# Patient Record
Sex: MALE | Race: WHITE | URBAN - METROPOLITAN AREA
[De-identification: names, ages, dates, MRNs, and addresses within clinical notes are randomized per-mention and may not be internally consistent; named-entity substitution may affect disease eponyms.]

---

## 2017-10-02 ENCOUNTER — OFFICE VISIT (OUTPATIENT)
Dept: INTERNAL MEDICINE CLINIC | Age: 62
End: 2017-10-02

## 2017-10-02 VITALS
HEART RATE: 61 BPM | SYSTOLIC BLOOD PRESSURE: 129 MMHG | BODY MASS INDEX: 24.52 KG/M2 | RESPIRATION RATE: 18 BRPM | HEIGHT: 72 IN | WEIGHT: 181 LBS | TEMPERATURE: 97.5 F | OXYGEN SATURATION: 95 % | DIASTOLIC BLOOD PRESSURE: 72 MMHG

## 2017-10-02 DIAGNOSIS — Z00.00 ROUTINE GENERAL MEDICAL EXAMINATION AT A HEALTH CARE FACILITY: Primary | ICD-10-CM

## 2017-10-02 DIAGNOSIS — E78.00 HYPERCHOLESTEREMIA: ICD-10-CM

## 2017-10-02 RX ORDER — CHOLECALCIFEROL TAB 125 MCG (5000 UNIT) 125 MCG
TAB ORAL DAILY
COMMUNITY

## 2017-10-02 RX ORDER — PRAVASTATIN SODIUM 40 MG/1
40 TABLET ORAL
Qty: 30 TAB | Refills: 2 | Status: SHIPPED | OUTPATIENT
Start: 2017-10-02 | End: 2017-12-21 | Stop reason: SDUPTHER

## 2017-10-02 RX ORDER — PRAVASTATIN SODIUM 40 MG/1
40 TABLET ORAL
COMMUNITY
End: 2018-01-09 | Stop reason: SDUPTHER

## 2017-10-02 RX ORDER — PRAVASTATIN SODIUM 10 MG/1
40 TABLET ORAL
COMMUNITY
End: 2017-10-02

## 2017-10-02 RX ORDER — PETROLATUM,WHITE/LANOLIN
OINTMENT (GRAM) TOPICAL 3 TIMES DAILY
COMMUNITY
End: 2018-10-18

## 2017-10-02 NOTE — MR AVS SNAPSHOT
Visit Information Date & Time Provider Department Dept. Phone Encounter #  
 10/2/2017  2:30 PM Yesenia RuedaJUAN [email or web address] [phone] [id number] Follow-up Instructions Return in about 4 days (around 10/6/2017), or if symptoms worsen or fail to improve. Upcoming Health Maintenance Date Due DTaP/Tdap/Td series (1 - Tdap) 3/30/1975 FOBT Q 1 YEAR AGE 50-75 3/30/2004 ZOSTER VACCINE AGE 60> 1/30/2014 INFLUENZA AGE 9 TO ADULT 8/1/2017 Allergies as of 10/2/2017  Never Reviewed Severity Noted Reaction Type Reactions Hayfebrol [Chlorpheniramine-pseudoephed]  10/02/2017    Runny Nose Current Immunizations  Never Reviewed No immunizations on file. Not reviewed this visit You Were Diagnosed With   
  
 Codes Comments Routine general medical examination at a health care facility    -  Primary ICD-10-CM: Z00.00 ICD-9-CM: V70.0 Hypercholesteremia     ICD-10-CM: E78.00 ICD-9-CM: 272.0 Vitals BP Pulse Temp Resp Height(growth percentile) Weight(growth percentile) 129/72 (BP 1 Location: Right arm, BP Patient Position: Sitting) 61 97.5 °F (36.4 °C) (Oral) 18 6' (1.829 m) 181 lb (82.1 kg) SpO2 BMI Smoking Status 95% 24.55 kg/m2 Never Smoker Vitals History BMI and BSA Data Body Mass Index Body Surface Area 24.55 kg/m 2 2.04 m 2 Preferred Pharmacy Pharmacy Name Phone [facility], [address] [place] [address] [phone] Your Updated Medication List  
  
   
This list is accurate as of: 10/2/17  3:02 PM.  Always use your most recent med list.  
  
  
  
  
 cholecalciferol (VITAMIN D3) 5,000 unit Tab tablet Commonly known as:  VITAMIN D3 Take  by mouth daily. glucosamine sulfate 500 mg capsule Take  by mouth three (3) times daily. * pravastatin 40 mg tablet Commonly known as:  PRAVACHOL  
 Take 40 mg by mouth nightly. * pravastatin 40 mg tablet Commonly known as:  PRAVACHOL Take 1 Tab by mouth nightly. * Notice: This list has 2 medication(s) that are the same as other medications prescribed for you. Read the directions carefully, and ask your doctor or other care provider to review them with you. Prescriptions Sent to Pharmacy Refills  
 pravastatin (PRAVACHOL) 40 mg tablet 2 Sig: Take 1 Tab by mouth nightly. Class: Normal  
 Pharmacy: [facility] [address] [place] [address], [address] [place] [address] #: [phone] Route: Oral  
  
Follow-up Instructions Return in about 4 days (around 10/6/2017), or if symptoms worsen or fail to improve. To-Do List   
 10/02/2017 Lab:  URINALYSIS W/MICROSCOPIC   
  
 10/03/2017 Lab:  CBC WITH AUTOMATED DIFF   
  
 10/03/2017 Lab:  LIPID PANEL   
  
 10/03/2017 Lab:  METABOLIC PANEL, COMPREHENSIVE Patient Instructions High Cholesterol: Care Instructions Your Care Instructions Cholesterol is a type of fat in your blood. It is needed for many body functions, such as making new cells. Cholesterol is made by your body. It also comes from food you eat. High cholesterol means that you have too much of the fat in your blood. This raises your risk of a heart attack and stroke. LDL and HDL are part of your total cholesterol. LDL is the \"bad\" cholesterol. High LDL can raise your risk for heart disease, heart attack, and stroke. HDL is the \"good\" cholesterol. It helps clear bad cholesterol from the body. High HDL is linked with a lower risk of heart disease, heart attack, and stroke. Your cholesterol levels help your doctor find out your risk for having a heart attack or stroke. You and your doctor can talk about whether you need to lower your risk and what treatment is best for you.  
A heart-healthy lifestyle along with medicines can help lower your cholesterol and your risk. The way you choose to lower your risk will depend on how high your risk is for heart attack and stroke. It will also depend on how you feel about taking medicines. Follow-up care is a key part of your treatment and safety. Be sure to make and go to all appointments, and call your doctor if you are having problems. It's also a good idea to know your test results and keep a list of the medicines you take. How can you care for yourself at home? · Eat a variety of foods every day. Good choices include fruits, vegetables, whole grains (like oatmeal), dried beans and peas, nuts and seeds, soy products (like tofu), and fat-free or low-fat dairy products. · Replace butter, margarine, and hydrogenated or partially hydrogenated oils with olive and canola oils. (Canola oil margarine without trans fat is fine.) · Replace red meat with fish, poultry, and soy protein (like tofu). · Limit processed and packaged foods like chips, crackers, and cookies. · Bake, broil, or steam foods. Don't dotson them. · Be physically active. Get at least 30 minutes of exercise on most days of the week. Walking is a good choice. You also may want to do other activities, such as running, swimming, cycling, or playing tennis or team sports. · Stay at a healthy weight or lose weight by making the changes in eating and physical activity listed above. Losing just a small amount of weight, even 5 to 10 pounds, can reduce your risk for having a heart attack or stroke. · Do not smoke. When should you call for help? Watch closely for changes in your health, and be sure to contact your doctor if: 
· You need help making lifestyle changes. · You have questions about your medicine. Where can you learn more? Go to http://tatiana-mason.info/. Enter D876 in the search box to learn more about \"High Cholesterol: Care Instructions. \" Current as of: April 3, 2017 Content Version: 11.3 © 8911-7554 Healthwise, [employer]. Care instructions adapted under license by [employer] (which disclaims liability or warranty for this information). If you have questions about a medical condition or this instruction, always ask your healthcare professional. [address] any warranty or liability for your use of this information. Well Visit, Men 48 to 72: Care Instructions Your Care Instructions Physical exams can help you stay healthy. Your doctor has checked your overall health and may have suggested ways to take good care of yourself. He or she also may have recommended tests. At home, you can help prevent illness with healthy eating, regular exercise, and other steps. Follow-up care is a key part of your treatment and safety. Be sure to make and go to all appointments, and call your doctor if you are having problems. It's also a good idea to know your test results and keep a list of the medicines you take. How can you care for yourself at home? · Reach and stay at a healthy weight. This will lower your risk for many problems, such as obesity, diabetes, heart disease, and high blood pressure. · Get at least 30 minutes of exercise on most days of the week. Walking is a good choice. You also may want to do other activities, such as running, swimming, cycling, or playing tennis or team sports. · Do not smoke. Smoking can make health problems worse. If you need help quitting, talk to your doctor about stop-smoking programs and medicines. These can increase your chances of quitting for good. · Protect your skin from too much sun. When you're outdoors from 10 a.m. to 4 p.m., stay in the shade or cover up with clothing and a hat with a wide brim. Wear sunglasses that block UV rays. Even when it's cloudy, put broad-spectrum sunscreen (SPF 30 or higher) on any exposed skin. · See a dentist one or two times a year for checkups and to have your teeth cleaned. · Wear a seat belt in the car. · Limit alcohol to 2 drinks a day. Too much alcohol can cause health problems. Follow your doctor's advice about when to have certain tests. These tests can spot problems early. · Cholesterol. Your doctor will tell you how often to have this done based on your overall health and other things that can increase your risk for heart attack and stroke. · Blood pressure. Have your blood pressure checked during a routine doctor visit. Your doctor will tell you how often to check your blood pressure based on your age, your blood pressure results, and other factors. · Prostate exam. Talk to your doctor about whether you should have a blood test (called a PSA test) for prostate cancer. Experts disagree on whether men should have this test. Some experts recommend that you discuss the benefits and risks of the test with your doctor. · Diabetes. Ask your doctor whether you should have tests for diabetes. · Vision. Some experts recommend that you have yearly exams for glaucoma and other age-related eye problems starting at age 48. · Hearing. Tell your doctor if you notice any change in your hearing. You can have tests to find out how well you hear. · Colon cancer. You should begin tests for colon cancer at age 48. You may have one of several tests. Your doctor will tell you how often to have tests based on your age and risk. Risks include whether you already had a precancerous polyp removed from your colon or whether your parent, brother, sister, or child has had colon cancer. · Heart attack and stroke risk. At least every 4 to 6 years, you should have your risk for heart attack and stroke assessed. Your doctor uses factors such as your age, blood pressure, cholesterol, and whether you smoke or have diabetes to show what your risk for a heart attack or stroke is over the next 10 years. · Abdominal aortic aneurysm.  Ask your doctor whether you should have a test to check for an aneurysm. You may need a test if you ever smoked or if your parent, brother, sister, or child has had an aneurysm. When should you call for help? Watch closely for changes in your health, and be sure to contact your doctor if you have any problems or symptoms that concern you. Where can you learn more? Go to http://tatiana-mason.info/. Enter Z825 in the search box to learn more about \"Well Visit, Men 48 to 72: Care Instructions. \" Current as of: July 19, 2016 Content Version: 11.3 © 3146-6165 [employer]. Care instructions adapted under license by [employer] (which disclaims liability or warranty for this information). If you have questions about a medical condition or this instruction, always ask your healthcare professional. [address] any warranty or liability for your use of this information. Introducing [facility]! Elizabeth Guerra introduces [employer] patient portal. Now you can access parts of your medical record, email your doctor's office, and request medication refills online. 1. In your internet browser, go to [email or web address] 2. Click on the First Time User? Click Here link in the Sign In box. You will see the New Member Sign Up page. 3. Enter your [employer] Access Code exactly as it appears below. You will not need to use this code after youve completed the sign-up process. If you do not sign up before the expiration date, you must request a new code. · [employer] Access Code: [email or web address] Expires: 12/31/2017  3:01 PM 
 
4. Enter the last four digits of your Social Security Number (xxxx) and Date of Birth (mm/dd/yyyy) as indicated and click Submit. You will be taken to the next sign-up page. 5. Create a [employer] ID. This will be your [employer] login ID and cannot be changed, so think of one that is secure and easy to remember. 6. Create a [employer] password. You can change your password at any time. 7. Enter your Password Reset Question and Answer. This can be used at a later time if you forget your password. 8. Enter your e-mail address. You will receive e-mail notification when new information is available in [address] 9. Click Sign Up. You can now view and download portions of your medical record. 10. Click the Download Summary menu link to download a portable copy of your medical information. If you have questions, please visit the Frequently Asked Questions section of the [employer] website. Remember, [employer] is NOT to be used for urgent needs. For medical emergencies, dial 911. Now available from your iPhone and Android! Please provide this summary of care documentation to your next provider. If you have any questions after today's visit, please call [phone].

## 2017-10-02 NOTE — PROGRESS NOTES
HISTORY OF PRESENT ILLNESS  Rona Benavides is a 60 y.o. male. Patient with a hx of hypercholesterolemia presents to establish care. Patient relocating from [place],\[facility] [place] [facility]\"" his wife is also seeing a doctor in the practice. Patient takes Pravastatin but is almost running out and would like to get a refill because he is about to travel. Patient will return in the AM for fasting labs. Patient denies any HA,blurry vision, unilateral weakness, slurred speech,facial drooling,drooping, NV,numbness,tingling,dizziness,palpitations, malaise,faigue,confusion,SOB,CP. Medication Refill   The history is provided by the patient. Review of Systems   Constitutional: Negative. HENT: Negative. Eyes: Negative. Respiratory: Negative. Cardiovascular: Negative. Gastrointestinal: Negative. Genitourinary: Negative. Musculoskeletal: Negative. Skin: Negative. Neurological: Negative. Psychiatric/Behavioral: Negative. Physical Exam   Constitutional: He is oriented to person, place, and time. He appears well-developed and well-nourished. /72 (BP 1 Location: Right arm, BP Patient Position: Sitting)  Pulse 61  Temp 97.5 °F (36.4 °C) (Oral)   Resp 18  Ht 6' (1.829 m)  Wt 181 lb (82.1 kg)  SpO2 95%  BMI 24.55 kg/m2     HENT:   Head: Normocephalic and atraumatic. Eyes: Conjunctivae and EOM are normal. Pupils are equal, round, and reactive to light. Neck: Normal range of motion. Cardiovascular: Normal rate. Pulmonary/Chest: Effort normal and breath sounds normal.   Abdominal: Soft. Bowel sounds are normal.   Musculoskeletal: Normal range of motion. Neurological: He is alert and oriented to person, place, and time. GCS eye subscore is 4. GCS verbal subscore is 5. GCS motor subscore is 6. Skin: Skin is warm and dry. Psychiatric: He has a normal mood and affect.  His speech is normal and behavior is normal. Judgment and thought content normal. Cognition and memory are normal.   Vitals reviewed. ASSESSMENT and PLAN    ICD-10-CM ICD-9-CM    1. Routine general medical examination at a health care facility F44.14 M43.9 METABOLIC PANEL, COMPREHENSIVE      CBC WITH AUTOMATED DIFF      URINALYSIS W/MICROSCOPIC   2. Hypercholesteremia E78.00 272.0 LIPID PANEL      pravastatin (PRAVACHOL) 40 mg tablet     Encounter Diagnoses   Name Primary?  Routine general medical examination at a health care facility Yes    Hypercholesteremia      Orders Placed This Encounter    METABOLIC PANEL, COMPREHENSIVE    CBC WITH AUTOMATED DIFF    LIPID PANEL    URINALYSIS W/MICROSCOPIC    DISCONTD: pravastatin (PRAVACHOL) 10 mg tablet    cholecalciferol, VITAMIN D3, (VITAMIN D3) 5,000 unit tab tablet    glucosamine sulfate 500 mg capsule    pravastatin (PRAVACHOL) 40 mg tablet    pravastatin (PRAVACHOL) 40 mg tablet     Orders Placed This Encounter    METABOLIC PANEL, COMPREHENSIVE     Standing Status:   Future     Standing Expiration Date:   10/3/2018    CBC WITH AUTOMATED DIFF     Standing Status:   Future     Standing Expiration Date:   10/3/2018    LIPID PANEL     Standing Status:   Future     Standing Expiration Date:   10/3/2018    URINALYSIS W/MICROSCOPIC     Standing Status:   Future     Standing Expiration Date:   10/3/2018    pravastatin (PRAVACHOL) 40 mg tablet     Sig: Take 1 Tab by mouth nightly. Dispense:  30 Tab     Refill:  2     Orders Placed This Encounter    METABOLIC PANEL, COMPREHENSIVE    CBC WITH AUTOMATED DIFF    LIPID PANEL    URINALYSIS W/MICROSCOPIC    pravastatin (PRAVACHOL) 40 mg tablet     Diagnoses and all orders for this visit:    1. Routine general medical examination at a health care facility  -     METABOLIC PANEL, COMPREHENSIVE; Future  -     CBC WITH AUTOMATED DIFF; Future  -     URINALYSIS W/MICROSCOPIC; Future    2. Hypercholesteremia  -     LIPID PANEL; Future  -     pravastatin (PRAVACHOL) 40 mg tablet; Take 1 Tab by mouth nightly.       Follow-up Disposition:  Return in about 4 days (around 10/6/2017), or if symptoms worsen or fail to improve. current treatment plan is effective, no change in therapy  the following changes in treatment are made: f/u x 4 months.

## 2017-10-02 NOTE — PROGRESS NOTES
ROOM # 9    Minh Barnett presents today for   Chief Complaint   Patient presents with    Medication Refill     pt states he is going out of town and need a refill on his cholesterol medication (pravastatin 40 mg)       Minh Barnett preferred language for health care discussion is english/other. Is someone accompanying this pt? no    Is the patient using any DME equipment during OV? no    Depression Screening:  PHQ over the last two weeks 10/2/2017   Little interest or pleasure in doing things Not at all   Feeling down, depressed or hopeless Not at all   Total Score PHQ 2 0       Learning Assessment:  Learning Assessment 10/2/2017   PRIMARY LEARNER Patient   HIGHEST LEVEL OF EDUCATION - PRIMARY LEARNER  SOME COLLEGE   PRIMARY LANGUAGE ENGLISH   LEARNER PREFERENCE PRIMARY DEMONSTRATION   ANSWERED BY patient   RELATIONSHIP SELF       Abuse Screening:  No flowsheet data found. Fall Risk  No flowsheet data found. Health Maintenance reviewed and discussed per provider. Yes    Minh Barnett is due for pt will discuss with PCP. Please order/place referral if appropriate. Advance Directive:  1. Do you have an advance directive in place? Patient Reply: no    2. If not, would you like material regarding how to put one in place? Patient Reply: no    Coordination of Care:  1. Have you been to the ER, urgent care clinic since your last visit? Hospitalized since your last visit? no    2. Have you seen or consulted any other health care providers outside of the [address] [place] [address] since your last visit? Include any pap smears or colon screening.  no

## 2017-10-02 NOTE — PATIENT INSTRUCTIONS
High Cholesterol: Care Instructions  Your Care Instructions  Cholesterol is a type of fat in your blood. It is needed for many body functions, such as making new cells. Cholesterol is made by your body. It also comes from food you eat. High cholesterol means that you have too much of the fat in your blood. This raises your risk of a heart attack and stroke. LDL and HDL are part of your total cholesterol. LDL is the \"bad\" cholesterol. High LDL can raise your risk for heart disease, heart attack, and stroke. HDL is the \"good\" cholesterol. It helps clear bad cholesterol from the body. High HDL is linked with a lower risk of heart disease, heart attack, and stroke. Your cholesterol levels help your doctor find out your risk for having a heart attack or stroke. You and your doctor can talk about whether you need to lower your risk and what treatment is best for you. A heart-healthy lifestyle along with medicines can help lower your cholesterol and your risk. The way you choose to lower your risk will depend on how high your risk is for heart attack and stroke. It will also depend on how you feel about taking medicines. Follow-up care is a key part of your treatment and safety. Be sure to make and go to all appointments, and call your doctor if you are having problems. It's also a good idea to know your test results and keep a list of the medicines you take. How can you care for yourself at home? · Eat a variety of foods every day. Good choices include fruits, vegetables, whole grains (like oatmeal), dried beans and peas, nuts and seeds, soy products (like tofu), and fat-free or low-fat dairy products. · Replace butter, margarine, and hydrogenated or partially hydrogenated oils with olive and canola oils. (Canola oil margarine without trans fat is fine.)  · Replace red meat with fish, poultry, and soy protein (like tofu). · Limit processed and packaged foods like chips, crackers, and cookies.   · Bake, broil, or steam foods. Don't dotson them. · Be physically active. Get at least 30 minutes of exercise on most days of the week. Walking is a good choice. You also may want to do other activities, such as running, swimming, cycling, or playing tennis or team sports. · Stay at a healthy weight or lose weight by making the changes in eating and physical activity listed above. Losing just a small amount of weight, even 5 to 10 pounds, can reduce your risk for having a heart attack or stroke. · Do not smoke. When should you call for help? Watch closely for changes in your health, and be sure to contact your doctor if:  · You need help making lifestyle changes. · You have questions about your medicine. Where can you learn more? Go to http://tatiana-mason.info/. Enter O829 in the search box to learn more about \"High Cholesterol: Care Instructions. \"  Current as of: April 3, 2017  Content Version: 11.3  © 0419-8949 [employer]. Care instructions adapted under license by [employer] (which disclaims liability or warranty for this information). If you have questions about a medical condition or this instruction, always ask your healthcare professional. [place] [address] any warranty or liability for your use of this information. Well Visit, Men 48 to 72: Care Instructions  Your Care Instructions  Physical exams can help you stay healthy. Your doctor has checked your overall health and may have suggested ways to take good care of yourself. He or she also may have recommended tests. At home, you can help prevent illness with healthy eating, regular exercise, and other steps. Follow-up care is a key part of your treatment and safety. Be sure to make and go to all appointments, and call your doctor if you are having problems. It's also a good idea to know your test results and keep a list of the medicines you take. How can you care for yourself at home?   · Reach and stay at a healthy weight. This will lower your risk for many problems, such as obesity, diabetes, heart disease, and high blood pressure. · Get at least 30 minutes of exercise on most days of the week. Walking is a good choice. You also may want to do other activities, such as running, swimming, cycling, or playing tennis or team sports. · Do not smoke. Smoking can make health problems worse. If you need help quitting, talk to your doctor about stop-smoking programs and medicines. These can increase your chances of quitting for good. · Protect your skin from too much sun. When you're outdoors from 10 a.m. to 4 p.m., stay in the shade or cover up with clothing and a hat with a wide brim. Wear sunglasses that block UV rays. Even when it's cloudy, put broad-spectrum sunscreen (SPF 30 or higher) on any exposed skin. · See a dentist one or two times a year for checkups and to have your teeth cleaned. · Wear a seat belt in the car. · Limit alcohol to 2 drinks a day. Too much alcohol can cause health problems. Follow your doctor's advice about when to have certain tests. These tests can spot problems early. · Cholesterol. Your doctor will tell you how often to have this done based on your overall health and other things that can increase your risk for heart attack and stroke. · Blood pressure. Have your blood pressure checked during a routine doctor visit. Your doctor will tell you how often to check your blood pressure based on your age, your blood pressure results, and other factors. · Prostate exam. Talk to your doctor about whether you should have a blood test (called a PSA test) for prostate cancer. Experts disagree on whether men should have this test. Some experts recommend that you discuss the benefits and risks of the test with your doctor. · Diabetes. Ask your doctor whether you should have tests for diabetes. · Vision.  Some experts recommend that you have yearly exams for glaucoma and other age-related eye problems starting at age 48. · Hearing. Tell your doctor if you notice any change in your hearing. You can have tests to find out how well you hear. · Colon cancer. You should begin tests for colon cancer at age 48. You may have one of several tests. Your doctor will tell you how often to have tests based on your age and risk. Risks include whether you already had a precancerous polyp removed from your colon or whether your parent, brother, sister, or child has had colon cancer. · Heart attack and stroke risk. At least every 4 to 6 years, you should have your risk for heart attack and stroke assessed. Your doctor uses factors such as your age, blood pressure, cholesterol, and whether you smoke or have diabetes to show what your risk for a heart attack or stroke is over the next 10 years. · Abdominal aortic aneurysm. Ask your doctor whether you should have a test to check for an aneurysm. You may need a test if you ever smoked or if your parent, brother, sister, or child has had an aneurysm. When should you call for help? Watch closely for changes in your health, and be sure to contact your doctor if you have any problems or symptoms that concern you. Where can you learn more? Go to http://tatiana-mason.info/. Enter O789 in the search box to learn more about \"Well Visit, Men 48 to 72: Care Instructions. \"  Current as of: July 19, 2016  Content Version: 11.3  © 9465-6461 [employer]. Care instructions adapted under license by [employer] (which disclaims liability or warranty for this information). If you have questions about a medical condition or this instruction, always ask your healthcare professional. [place] [address] any warranty or liability for your use of this information.

## 2017-10-02 NOTE — PROGRESS NOTES
Patient with a hx of hypercholesterolemia presents to establish care. Patient relocating from [place],[facility] his wife is also seeing a doctor in the practice. Patient takes Pravastatin but is almost running out and would like to get a refill because he is about to travel. Patient will return in the AM for fasting labs. Patient denies any HA,blurry vision, unilateral weakness, slurred speech,facial drooling,drooping, NV,numbness,tingling,dizziness,palpitations, malaise,faigue,confusion,SOB,CP.

## 2017-10-03 ENCOUNTER — HOSPITAL ENCOUNTER (OUTPATIENT)
Dept: LAB | Age: 62
Discharge: HOME | End: 2017-10-03
Payer: COMMERCIAL

## 2017-10-03 DIAGNOSIS — Z00.00 ROUTINE GENERAL MEDICAL EXAMINATION AT A HEALTH CARE FACILITY: ICD-10-CM

## 2017-10-03 DIAGNOSIS — E78.00 HYPERCHOLESTEREMIA: ICD-10-CM

## 2017-10-03 LAB
ALBUMIN SERPL-MCNC: 4.4 G/DL (ref 3.4–5)
ALBUMIN/GLOB SERPL: 1.4 {RATIO} (ref 0.8–1.7)
ALP SERPL-CCNC: 72 U/L (ref 45–117)
ALT SERPL-CCNC: 34 U/L (ref 16–61)
ANION GAP SERPL CALC-SCNC: 2 MMOL/L (ref 3–18)
APPEARANCE UR: CLEAR
AST SERPL-CCNC: 21 U/L (ref 15–37)
BACTERIA URNS QL MICRO: NEGATIVE /HPF
BASOPHILS # BLD: 0.1 K/UL (ref 0–0.06)
BASOPHILS NFR BLD: 1 % (ref 0–2)
BILIRUB SERPL-MCNC: 0.5 MG/DL (ref 0.2–1)
BILIRUB UR QL: NEGATIVE
BUN SERPL-MCNC: 19 MG/DL (ref 7–18)
BUN/CREAT SERPL: 14 (ref 12–20)
CALCIUM SERPL-MCNC: 9.3 MG/DL (ref 8.5–10.1)
CHLORIDE SERPL-SCNC: 105 MMOL/L (ref 100–108)
CHOLEST SERPL-MCNC: 217 MG/DL
CO2 SERPL-SCNC: 29 MMOL/L (ref 21–32)
COLOR UR: YELLOW
CREAT SERPL-MCNC: 1.32 MG/DL (ref 0.6–1.3)
DIFFERENTIAL METHOD BLD: ABNORMAL
EOSINOPHIL # BLD: 0.2 K/UL (ref 0–0.4)
EOSINOPHIL NFR BLD: 3 % (ref 0–5)
EPITH CASTS URNS QL MICRO: NORMAL /LPF (ref 0–5)
ERYTHROCYTE [DISTWIDTH] IN BLOOD BY AUTOMATED COUNT: 14.6 % (ref 11.6–14.5)
GLOBULIN SER CALC-MCNC: 3.2 G/DL (ref 2–4)
GLUCOSE SERPL-MCNC: 83 MG/DL (ref 74–99)
GLUCOSE UR STRIP.AUTO-MCNC: NEGATIVE MG/DL
HCT VFR BLD AUTO: 46.4 % (ref 36–48)
HDLC SERPL-MCNC: 83 MG/DL (ref 40–60)
HDLC SERPL: 2.6 {RATIO} (ref 0–5)
HGB BLD-MCNC: 15.6 G/DL (ref 13–16)
HGB UR QL STRIP: NEGATIVE
KETONES UR QL STRIP.AUTO: NEGATIVE MG/DL
LDLC SERPL CALC-MCNC: 113.2 MG/DL (ref 0–100)
LEUKOCYTE ESTERASE UR QL STRIP.AUTO: NEGATIVE
LIPID PROFILE,FLP: ABNORMAL
LYMPHOCYTES # BLD: 1.4 K/UL (ref 0.9–3.6)
LYMPHOCYTES NFR BLD: 22 % (ref 21–52)
MCH RBC QN AUTO: 31.6 PG (ref 24–34)
MCHC RBC AUTO-ENTMCNC: 33.6 G/DL (ref 31–37)
MCV RBC AUTO: 93.9 FL (ref 74–97)
MONOCYTES # BLD: 0.5 K/UL (ref 0.05–1.2)
MONOCYTES NFR BLD: 8 % (ref 3–10)
NEUTS SEG # BLD: 4.3 K/UL (ref 1.8–8)
NEUTS SEG NFR BLD: 66 % (ref 40–73)
NITRITE UR QL STRIP.AUTO: NEGATIVE
PH UR STRIP: 5.5 [PH] (ref 5–8)
PLATELET # BLD AUTO: 218 K/UL (ref 135–420)
PMV BLD AUTO: 10.8 FL (ref 9.2–11.8)
POTASSIUM SERPL-SCNC: 4.1 MMOL/L (ref 3.5–5.5)
PROT SERPL-MCNC: 7.6 G/DL (ref 6.4–8.2)
PROT UR STRIP-MCNC: NEGATIVE MG/DL
RBC # BLD AUTO: 4.94 M/UL (ref 4.7–5.5)
RBC #/AREA URNS HPF: 0 /HPF (ref 0–5)
SODIUM SERPL-SCNC: 136 MMOL/L (ref 136–145)
SP GR UR REFRACTOMETRY: 1.01 (ref 1–1.03)
TRIGL SERPL-MCNC: 104 MG/DL (ref ?–150)
UROBILINOGEN UR QL STRIP.AUTO: 0.2 EU/DL (ref 0.2–1)
VLDLC SERPL CALC-MCNC: 20.8 MG/DL
WBC # BLD AUTO: 6.5 K/UL (ref 4.6–13.2)
WBC URNS QL MICRO: NORMAL /HPF (ref 0–4)

## 2017-10-03 PROCEDURE — 36415 COLL VENOUS BLD VENIPUNCTURE: CPT | Performed by: NURSE PRACTITIONER

## 2017-10-03 PROCEDURE — 81001 URINALYSIS AUTO W/SCOPE: CPT | Performed by: NURSE PRACTITIONER

## 2017-10-03 PROCEDURE — 85025 COMPLETE CBC W/AUTO DIFF WBC: CPT | Performed by: NURSE PRACTITIONER

## 2017-10-03 PROCEDURE — 80053 COMPREHEN METABOLIC PANEL: CPT | Performed by: NURSE PRACTITIONER

## 2017-10-03 PROCEDURE — 80061 LIPID PANEL: CPT | Performed by: NURSE PRACTITIONER

## 2017-10-04 ENCOUNTER — CLINICAL SUPPORT (OUTPATIENT)
Dept: INTERNAL MEDICINE CLINIC | Age: 62
End: 2017-10-04

## 2017-10-04 DIAGNOSIS — Z23 ENCOUNTER FOR IMMUNIZATION: Primary | ICD-10-CM

## 2017-10-04 NOTE — PROGRESS NOTES
Conrado Chacon is a 60 y.o. male who presents for routine immunizations. He denies any symptoms , reactions or allergies that would exclude them from being immunized today. Risks and adverse reactions were discussed and the VIS was given to them. All questions were addressed. He was observed for 10 min post injection. There were no reactions observed.     Wilfredo Lilly LPN

## 2017-12-14 ENCOUNTER — OFFICE VISIT (OUTPATIENT)
Dept: INTERNAL MEDICINE CLINIC | Age: 62
End: 2017-12-14

## 2017-12-14 VITALS
HEART RATE: 68 BPM | OXYGEN SATURATION: 95 % | SYSTOLIC BLOOD PRESSURE: 137 MMHG | WEIGHT: 184 LBS | BODY MASS INDEX: 24.92 KG/M2 | TEMPERATURE: 97.6 F | HEIGHT: 72 IN | RESPIRATION RATE: 17 BRPM | DIASTOLIC BLOOD PRESSURE: 88 MMHG

## 2017-12-14 DIAGNOSIS — J01.81 OTHER ACUTE RECURRENT SINUSITIS: Primary | ICD-10-CM

## 2017-12-14 DIAGNOSIS — J34.89 SINUS PRESSURE: ICD-10-CM

## 2017-12-14 RX ORDER — AZITHROMYCIN 500 MG/1
500 TABLET, FILM COATED ORAL DAILY
Qty: 5 TAB | Refills: 0 | Status: SHIPPED | OUTPATIENT
Start: 2017-12-14 | End: 2018-04-24

## 2017-12-14 RX ORDER — FLUTICASONE PROPIONATE 50 MCG
2 SPRAY, SUSPENSION (ML) NASAL DAILY
COMMUNITY
End: 2019-02-18 | Stop reason: SDUPTHER

## 2017-12-14 RX ORDER — FLUTICASONE PROPIONATE 50 MCG
2 SPRAY, SUSPENSION (ML) NASAL DAILY
Qty: 1 BOTTLE | Refills: 0 | Status: SHIPPED | OUTPATIENT
Start: 2017-12-14

## 2017-12-14 NOTE — MR AVS SNAPSHOT
Visit Information Date & Time Provider Department Dept. Phone Encounter #  
 12/14/2017  1:30 PM Valerie Shepherd NP [facility] [phone] [id number] Follow-up Instructions Return if symptoms worsen or fail to improve. Upcoming Health Maintenance Date Due Hepatitis C Screening 1955 DTaP/Tdap/Td series (1 - Tdap) 3/31/1975 FOBT Q 1 YEAR AGE 50-75 3/31/2004 ZOSTER VACCINE AGE 60> 1/31/2014 Allergies as of 12/14/2017  Review Complete On: 12/14/2017 By: Nalini Vigil Severity Noted Reaction Type Reactions Hayfebrol [Chlorpheniramine-pseudoephed]  10/02/2017    Runny Nose Current Immunizations  Never Reviewed Name Date Influenza Vaccine (Quad) PF 10/4/2017 Not reviewed this visit You Were Diagnosed With   
  
 Codes Comments Other acute recurrent sinusitis    -  Primary ICD-10-CM: J01.81 
ICD-9-CM: 461.9 Sinus pressure     ICD-10-CM: J34.89 ICD-9-CM: 478.19 Vitals BP Pulse Temp Resp Height(growth percentile) Weight(growth percentile) 137/88 (BP 1 Location: Right arm, BP Patient Position: Sitting) 68 97.6 °F (36.4 °C) (Oral) 17 6' (1.829 m) 184 lb (83.5 kg) SpO2 BMI Smoking Status 95% 24.95 kg/m2 Never Smoker Vitals History BMI and BSA Data Body Mass Index Body Surface Area 24.95 kg/m 2 2.06 m 2 Preferred Pharmacy Pharmacy Name Phone [facility] [address] [place] [address] [phone] Your Updated Medication List  
  
   
This list is accurate as of: 12/14/17  2:14 PM.  Always use your most recent med list.  
  
  
  
  
 azithromycin 500 mg Tab Commonly known as:  Sherry Collar Take 1 Tab by mouth daily. cholecalciferol (VITAMIN D3) 5,000 unit Tab tablet Commonly known as:  VITAMIN D3 Take  by mouth daily. * FLONASE 50 mcg/actuation nasal spray Generic drug:  fluticasone 2 Sprays by Both Nostrils route daily. * fluticasone 50 mcg/actuation nasal spray Commonly known as:  Nhan Acton 2 Sprays by Both Nostrils route daily. glucosamine sulfate 500 mg capsule Take  by mouth three (3) times daily. * pravastatin 40 mg tablet Commonly known as:  PRAVACHOL Take 40 mg by mouth nightly. * pravastatin 40 mg tablet Commonly known as:  PRAVACHOL Take 1 Tab by mouth nightly. * Notice: This list has 4 medication(s) that are the same as other medications prescribed for you. Read the directions carefully, and ask your doctor or other care provider to review them with you. Prescriptions Sent to Pharmacy Refills  
 fluticasone (FLONASE) 50 mcg/actuation nasal spray 0 Si Sprays by Both Nostrils route daily. Class: Normal  
 Pharmacy: [facility] [address] [place] [address] Ph #: [phone] Route: Both Nostrils  
 azithromycin (ZITHROMAX) 500 mg tab 0 Sig: Take 1 Tab by mouth daily. Class: Normal  
 Pharmacy: [facility] [address] [place] [address] Ph #: [phone] Route: Oral  
  
Follow-up Instructions Return if symptoms worsen or fail to improve. Patient Instructions Saline Nasal Washes: Care Instructions Your Care Instructions Saline nasal washes help keep the nasal passages open by washing out thick or dried mucus. This simple remedy can help relieve symptoms of allergies, sinusitis, and colds. It also can make the nose feel more comfortable by keeping the mucous membranes moist. You may notice a little burning sensation in your nose the first few times you use the solution, but this usually gets better in a few days. Follow-up care is a key part of your treatment and safety.  Be sure to make and go to all appointments, and call your doctor if you are having problems. It's also a good idea to know your test results and keep a list of the medicines you take. How can you care for yourself at home? · You can buy premixed saline solution in a squeeze bottle or other sinus rinse products at a drugstore. Read and follow the instructions on the label. · You also can make your own saline solution by adding 1 teaspoon of salt and 1 teaspoon of baking soda to 2 cups of distilled water. · If you use a homemade solution, pour a small amount into a clean bowl. Using a rubber bulb syringe, squeeze the syringe and place the tip in the salt water. Pull a small amount of the salt water into the syringe by relaxing your hand. · Sit down with your head tilted slightly back. Do not lie down. Put the tip of the bulb syringe or the squeeze bottle a little way into one of your nostrils. Gently drip or squirt a few drops into the nostril. Repeat with the other nostril. Some sneezing and gagging are normal at first. 
· Gently blow your nose. · Wipe the syringe or bottle tip clean after each use. · Repeat this 2 or 3 times a day. · Use nasal washes gently if you have nosebleeds often. When should you call for help? Watch closely for changes in your health, and be sure to contact your doctor if: 
? · You often get nosebleeds. ? · You have problems doing the nasal washes. Where can you learn more? Go to http://tatiana-mason.info/. Enter [id number] in the search box to learn more about \"Saline Nasal Washes: Care Instructions. \" Current as of: May 12, 2017 Content Version: 11.4 © 1633-1387 [employer]. Care instructions adapted under license by [employer] (which disclaims liability or warranty for this information). If you have questions about a medical condition or this instruction, always ask your healthcare professional. [address] any warranty or liability for your use of this information. Sinusitis: Care Instructions Your Care Instructions Sinusitis is an infection of the lining of the sinus cavities in your head. Sinusitis often follows a cold. It causes pain and pressure in your head and face. In most cases, sinusitis gets better on its own in 1 to 2 weeks. But some mild symptoms may last for several weeks. Sometimes antibiotics are needed. Follow-up care is a key part of your treatment and safety. Be sure to make and go to all appointments, and call your doctor if you are having problems. It's also a good idea to know your test results and keep a list of the medicines you take. How can you care for yourself at home? · Take an over-the-counter pain medicine, such as acetaminophen (Tylenol), ibuprofen (Advil, Motrin), or naproxen (Aleve). Read and follow all instructions on the label. · If the doctor prescribed antibiotics, take them as directed. Do not stop taking them just because you feel better. You need to take the full course of antibiotics. · Be careful when taking over-the-counter cold or flu medicines and Tylenol at the same time. Many of these medicines have acetaminophen, which is Tylenol. Read the labels to make sure that you are not taking more than the recommended dose. Too much acetaminophen (Tylenol) can be harmful. · Breathe warm, moist air from a steamy shower, a hot bath, or a sink filled with hot water. Avoid cold, dry air. Using a humidifier in your home may help. Follow the directions for cleaning the machine. · Use saline (saltwater) nasal washes to help keep your nasal passages open and wash out mucus and bacteria. You can buy saline nose drops at a grocery store or drugstore. Or you can make your own at home by adding 1 teaspoon of salt and 1 teaspoon of baking soda to 2 cups of distilled water. If you make your own, fill a bulb syringe with the solution, insert the tip into your nostril, and squeeze gently. [place] your nose. · Put a hot, wet towel or a warm gel pack on your face 3 or 4 times a day for 5 to 10 minutes each time. · Try a decongestant nasal spray like oxymetazoline (Afrin). Do not use it for more than 3 days in a row. Using it for more than 3 days can make your congestion worse. When should you call for help? Call your doctor now or seek immediate medical care if: 
? · You have new or worse swelling or redness in your face or around your eyes. ? · You have a new or higher fever. ? Watch closely for changes in your health, and be sure to contact your doctor if: 
? · You have new or worse facial pain. ? · The mucus from your nose becomes thicker (like pus) or has new blood in it. ? · You are not getting better as expected. Where can you learn more? Go to http://tatiana-mason.info/. Enter P265 in the search box to learn more about \"Sinusitis: Care Instructions. \" Current as of: May 12, 2017 Content Version: 11.4 © 8811-9361 [employer]. Care instructions adapted under license by [employer] (which disclaims liability or warranty for this information). If you have questions about a medical condition or this instruction, always ask your healthcare professional. [address] any warranty or liability for your use of this information. Introducing [facility]! Viktor Gayle introduces [employer] patient portal. Now you can access parts of your medical record, email your doctor's office, and request medication refills online. 1. In your internet browser, go to [email or web address] 2. Click on the First Time User? Click Here link in the Sign In box. You will see the New Member Sign Up page. 3. Enter your [employer] Access Code exactly as it appears below. You will not need to use this code after youve completed the sign-up process. If you do not sign up before the expiration date, you must request a new code. · [employer] Access Code: [email or web address] Expires: 12/31/2017  2:01 PM 
 
4. Enter the last four digits of your Social Security Number (xxxx) and Date of Birth (mm/dd/yyyy) as indicated and click Submit. You will be taken to the next sign-up page. 5. Create a First [employer]ight ID. This will be your First [employer] login ID and cannot be changed, so think of one that is secure and easy to remember. 6. Create a [employer] password. You can change your password at any time. 7. Enter your Password Reset Question and Answer. This can be used at a later time if you forget your password. 8. Enter your e-mail address. You will receive e-mail notification when new information is available in [address] 9. Click Sign Up. You can now view and download portions of your medical record. 10. Click the Download Summary menu link to download a portable copy of your medical information. If you have questions, please visit the Frequently Asked Questions section of the First [employer] website. Remember, [employer] is NOT to be used for urgent needs. For medical emergencies, dial 911. Now available from your iPhone and Android! Please provide this summary of care documentation to your next provider. If you have any questions after today's visit, please call [phone].

## 2017-12-14 NOTE — PATIENT INSTRUCTIONS
Saline Nasal Washes: Care Instructions  Your Care Instructions  Saline nasal washes help keep the nasal passages open by washing out thick or dried mucus. This simple remedy can help relieve symptoms of allergies, sinusitis, and colds. It also can make the nose feel more comfortable by keeping the mucous membranes moist. You may notice a little burning sensation in your nose the first few times you use the solution, but this usually gets better in a few days. Follow-up care is a key part of your treatment and safety. Be sure to make and go to all appointments, and call your doctor if you are having problems. It's also a good idea to know your test results and keep a list of the medicines you take. How can you care for yourself at home? · You can buy premixed saline solution in a squeeze bottle or other sinus rinse products at a drugstore. Read and follow the instructions on the label. · You also can make your own saline solution by adding 1 teaspoon of salt and 1 teaspoon of baking soda to 2 cups of distilled water. · If you use a homemade solution, pour a small amount into a clean bowl. Using a rubber bulb syringe, squeeze the syringe and place the tip in the salt water. Pull a small amount of the salt water into the syringe by relaxing your hand. · Sit down with your head tilted slightly back. Do not lie down. Put the tip of the bulb syringe or the squeeze bottle a little way into one of your nostrils. Gently drip or squirt a few drops into the nostril. Repeat with the other nostril. Some sneezing and gagging are normal at first.  · Gently blow your nose. · Wipe the syringe or bottle tip clean after each use. · Repeat this 2 or 3 times a day. · Use nasal washes gently if you have nosebleeds often. When should you call for help? Watch closely for changes in your health, and be sure to contact your doctor if:  ? · You often get nosebleeds. ? · You have problems doing the nasal washes.    Where can you learn more? Go to http://tatiana-mason.info/. Enter [id number] in the search box to learn more about \"Saline Nasal Washes: Care Instructions. \"  Current as of: May 12, 2017  Content Version: 11.4  © 0114-1559 [employer]. Care instructions adapted under license by [employer] (which disclaims liability or warranty for this information). If you have questions about a medical condition or this instruction, always ask your healthcare professional. [address] any warranty or liability for your use of this information. Sinusitis: Care Instructions  Your Care Instructions    Sinusitis is an infection of the lining of the sinus cavities in your head. Sinusitis often follows a cold. It causes pain and pressure in your head and face. In most cases, sinusitis gets better on its own in 1 to 2 weeks. But some mild symptoms may last for several weeks. Sometimes antibiotics are needed. Follow-up care is a key part of your treatment and safety. Be sure to make and go to all appointments, and call your doctor if you are having problems. It's also a good idea to know your test results and keep a list of the medicines you take. How can you care for yourself at home? · Take an over-the-counter pain medicine, such as acetaminophen (Tylenol), ibuprofen (Advil, Motrin), or naproxen (Aleve). Read and follow all instructions on the label. · If the doctor prescribed antibiotics, take them as directed. Do not stop taking them just because you feel better. You need to take the full course of antibiotics. · Be careful when taking over-the-counter cold or flu medicines and Tylenol at the same time. Many of these medicines have acetaminophen, which is Tylenol. Read the labels to make sure that you are not taking more than the recommended dose. Too much acetaminophen (Tylenol) can be harmful.   · Breathe warm, moist air from a steamy shower, a hot bath, or a sink filled with hot water. Avoid cold, dry air. Using a humidifier in your home may help. Follow the directions for cleaning the machine. · Use saline (saltwater) nasal washes to help keep your nasal passages open and wash out mucus and bacteria. You can buy saline nose drops at a grocery store or drugstore. Or you can make your own at home by adding 1 teaspoon of salt and 1 teaspoon of baking soda to 2 cups of distilled water. If you make your own, fill a bulb syringe with the solution, insert the tip into your nostril, and squeeze gently. Laura Hals your nose. · Put a hot, wet towel or a warm gel pack on your face 3 or 4 times a day for 5 to 10 minutes each time. · Try a decongestant nasal spray like oxymetazoline (Afrin). Do not use it for more than 3 days in a row. Using it for more than 3 days can make your congestion worse. When should you call for help? Call your doctor now or seek immediate medical care if:  ? · You have new or worse swelling or redness in your face or around your eyes. ? · You have a new or higher fever. ? Watch closely for changes in your health, and be sure to contact your doctor if:  ? · You have new or worse facial pain. ? · The mucus from your nose becomes thicker (like pus) or has new blood in it. ? · You are not getting better as expected. Where can you learn more? Go to http://tatiana-mason.info/. Enter T709 in the search box to learn more about \"Sinusitis: Care Instructions. \"  Current as of: May 12, 2017  Content Version: 11.4  © 3160-9980 [employer]. Care instructions adapted under license by [employer] (which disclaims liability or warranty for this information). If you have questions about a medical condition or this instruction, always ask your healthcare professional. [address] any warranty or liability for your use of this information.

## 2017-12-14 NOTE — PROGRESS NOTES
Patient presents with sinus pressure,sinus congestion,sinus HA x 4 days, states symptoms are worse,states he has taken Sudafed,Mucinex and Tylenol PO with minimal relief,states he takes Flonase all year round RT seasonal allergies,states he has hx of sinus infection,typically around this time of the year. Patient denies any fever,chills, NVD,dizziness,CP,SOB.

## 2017-12-14 NOTE — PROGRESS NOTES
HISTORY OF PRESENT ILLNESS  Cordell Vega is a 60 y.o. male. Patient presents with sinus pressure,sinus congestion,sinus HA x 4 days, states symptoms are worse,states he has taken Sudafed,Mucinex and Tylenol PO with minimal relief,states he takes Flonase all year round RT seasonal allergies,states he has hx of sinus infection,typically around this time of the year. Patient denies any fever,chills, NVD,dizziness,CP,SOB  Cold Symptoms   The history is provided by the patient. This is a new problem. The current episode started more than 2 days ago. The problem occurs constantly. The problem has been gradually worsening. There has been no fever. Associated symptoms include headaches. Pertinent negatives include no chest pain, no chills, no sweats, no weight loss, no eye redness, no ear congestion, no ear pain, no sore throat, no myalgias, no shortness of breath, no nausea, no vomiting and no confusion. He has tried decongestants for the symptoms. Review of Systems   Constitutional: Negative. Negative for chills and weight loss. HENT: Positive for congestion and sinus pain. Negative for ear pain and sore throat. Eyes: Negative. Negative for redness. Respiratory: Negative. Negative for shortness of breath. Cardiovascular: Negative. Negative for chest pain. Gastrointestinal: Negative. Negative for nausea and vomiting. Genitourinary: Negative. Musculoskeletal: Negative. Negative for myalgias. Skin: Negative. Neurological: Positive for headaches. Psychiatric/Behavioral: Negative. Negative for confusion. Physical Exam   Constitutional: He is oriented to person, place, and time. He appears well-developed and well-nourished. /88 (BP 1 Location: Right arm, BP Patient Position: Sitting)  Pulse 68  Temp 97.6 °F (36.4 °C) (Oral)   Resp 17  Ht 6' (1.829 m)  Wt 184 lb (83.5 kg)  SpO2 95%  BMI 24.95 kg/m2     HENT:   Head: Normocephalic and atraumatic.    Nose: Mucosal edema and sinus tenderness present. Right sinus exhibits maxillary sinus tenderness. Left sinus exhibits maxillary sinus tenderness. Eyes: Conjunctivae and EOM are normal. Pupils are equal, round, and reactive to light. Neck: Normal range of motion. Cardiovascular: Normal rate. Pulmonary/Chest: Effort normal and breath sounds normal.   Musculoskeletal: Normal range of motion. Neurological: He is alert and oriented to person, place, and time. GCS eye subscore is 4. GCS verbal subscore is 5. GCS motor subscore is 6. Skin: Skin is warm and dry. Psychiatric: He has a normal mood and affect. His speech is normal and behavior is normal. Judgment and thought content normal. Cognition and memory are normal.   Vitals reviewed. ASSESSMENT and PLAN    ICD-10-CM ICD-9-CM    1. Other acute recurrent sinusitis J01.81 461.9 fluticasone (FLONASE) 50 mcg/actuation nasal spray      azithromycin (ZITHROMAX) 500 mg tab   2. Sinus pressure J34.89 478.19 fluticasone (FLONASE) 50 mcg/actuation nasal spray      azithromycin (ZITHROMAX) 500 mg tab     Encounter Diagnoses   Name Primary?  Other acute recurrent sinusitis Yes    Sinus pressure      Orders Placed This Encounter    fluticasone (FLONASE) 50 mcg/actuation nasal spray    fluticasone (FLONASE) 50 mcg/actuation nasal spray    azithromycin (ZITHROMAX) 500 mg tab     Orders Placed This Encounter    fluticasone (FLONASE) 50 mcg/actuation nasal spray     Si Sprays by Both Nostrils route daily. Dispense:  1 Bottle     Refill:  0    azithromycin (ZITHROMAX) 500 mg tab     Sig: Take 1 Tab by mouth daily. Dispense:  5 Tab     Refill:  0     Orders Placed This Encounter    fluticasone (FLONASE) 50 mcg/actuation nasal spray    azithromycin (ZITHROMAX) 500 mg tab     Diagnoses and all orders for this visit:    1. Other acute recurrent sinusitis  -     fluticasone (FLONASE) 50 mcg/actuation nasal spray; 2 Sprays by Both Nostrils route daily.   -     azithromycin (ZITHROMAX) 500 mg tab; Take 1 Tab by mouth daily. 2. Sinus pressure  -     fluticasone (FLONASE) 50 mcg/actuation nasal spray; 2 Sprays by Both Nostrils route daily. -     azithromycin (ZITHROMAX) 500 mg tab; Take 1 Tab by mouth daily. Follow-up Disposition:  Return if symptoms worsen or fail to improve.   current treatment plan is effective, no change in therapy

## 2017-12-14 NOTE — PROGRESS NOTES
ROOM # 3    Alexandre Stovall presents today for   Chief Complaint   Patient presents with    Cold Symptoms     x 2 days        Alexandre Stovall preferred language for health care discussion is english/other. Is someone accompanying this pt? no    Is the patient using any DME equipment during OV? no    Depression Screening:  PHQ over the last two weeks 10/2/2017   Little interest or pleasure in doing things Not at all   Feeling down, depressed or hopeless Not at all   Total Score PHQ 2 0       Learning Assessment:  Learning Assessment 10/2/2017   PRIMARY LEARNER Patient   HIGHEST LEVEL OF EDUCATION - PRIMARY LEARNER  SOME COLLEGE   PRIMARY LANGUAGE ENGLISH   LEARNER PREFERENCE PRIMARY DEMONSTRATION   ANSWERED BY patient   RELATIONSHIP SELF       Abuse Screening:  n/i    Fall Risk  n/i    Health Maintenance reviewed and discussed per provider. Yes    Alexandre Stovall is due for nothing at this time. Please order/place referral if appropriate. Advance Directive:  1. Do you have an advance directive in place? Patient Reply: no    2. If not, would you like material regarding how to put one in place? Patient Reply: no    Coordination of Care:  1. Have you been to the ER, urgent care clinic since your last visit? Hospitalized since your last visit? no    2. Have you seen or consulted any other health care providers outside of the [address] [place] [address] since your last visit? Include any pap smears or colon screening.  no

## 2017-12-21 DIAGNOSIS — E78.00 HYPERCHOLESTEREMIA: ICD-10-CM

## 2017-12-21 RX ORDER — PRAVASTATIN SODIUM 40 MG/1
TABLET ORAL
Qty: 30 TAB | Refills: 0 | Status: SHIPPED | OUTPATIENT
Start: 2017-12-21 | End: 2019-02-18 | Stop reason: SINTOL

## 2018-01-09 RX ORDER — PRAVASTATIN SODIUM 40 MG/1
40 TABLET ORAL
Qty: 90 TAB | Refills: 0 | Status: SHIPPED | OUTPATIENT
Start: 2018-01-09 | End: 2018-03-22 | Stop reason: SDUPTHER

## 2018-03-22 ENCOUNTER — HOSPITAL ENCOUNTER (OUTPATIENT)
Dept: LAB | Age: 63
Discharge: HOME | End: 2018-03-22
Payer: COMMERCIAL

## 2018-03-22 ENCOUNTER — OFFICE VISIT (OUTPATIENT)
Dept: FAMILY MEDICINE CLINIC | Age: 63
End: 2018-03-22

## 2018-03-22 VITALS
HEART RATE: 55 BPM | WEIGHT: 176 LBS | HEIGHT: 72 IN | DIASTOLIC BLOOD PRESSURE: 87 MMHG | SYSTOLIC BLOOD PRESSURE: 140 MMHG | RESPIRATION RATE: 18 BRPM | OXYGEN SATURATION: 98 % | TEMPERATURE: 97 F | BODY MASS INDEX: 23.84 KG/M2

## 2018-03-22 DIAGNOSIS — Z00.00 ROUTINE GENERAL MEDICAL EXAMINATION AT A HEALTH CARE FACILITY: ICD-10-CM

## 2018-03-22 DIAGNOSIS — Z71.89 ADVANCED CARE PLANNING/COUNSELING DISCUSSION: ICD-10-CM

## 2018-03-22 DIAGNOSIS — Z12.5 PROSTATE CANCER SCREENING: ICD-10-CM

## 2018-03-22 DIAGNOSIS — E78.00 PURE HYPERCHOLESTEROLEMIA: ICD-10-CM

## 2018-03-22 DIAGNOSIS — Z00.00 ROUTINE GENERAL MEDICAL EXAMINATION AT A HEALTH CARE FACILITY: Primary | ICD-10-CM

## 2018-03-22 DIAGNOSIS — Z11.59 NEED FOR HEPATITIS C SCREENING TEST: ICD-10-CM

## 2018-03-22 DIAGNOSIS — M51.36 DEGENERATIVE DISC DISEASE, LUMBAR: ICD-10-CM

## 2018-03-22 DIAGNOSIS — Z13.31 SCREENING FOR DEPRESSION: ICD-10-CM

## 2018-03-22 LAB
ALBUMIN SERPL-MCNC: 4.6 G/DL (ref 3.4–5)
ALBUMIN/GLOB SERPL: 1.5 {RATIO} (ref 0.8–1.7)
ALP SERPL-CCNC: 63 U/L (ref 45–117)
ALT SERPL-CCNC: 35 U/L (ref 16–61)
ANION GAP SERPL CALC-SCNC: 7 MMOL/L (ref 3–18)
AST SERPL-CCNC: 21 U/L (ref 15–37)
BILIRUB SERPL-MCNC: 0.8 MG/DL (ref 0.2–1)
BUN SERPL-MCNC: 16 MG/DL (ref 7–18)
BUN/CREAT SERPL: 12 (ref 12–20)
CALCIUM SERPL-MCNC: 9.6 MG/DL (ref 8.5–10.1)
CHLORIDE SERPL-SCNC: 101 MMOL/L (ref 100–108)
CHOLEST SERPL-MCNC: 193 MG/DL
CO2 SERPL-SCNC: 29 MMOL/L (ref 21–32)
CREAT SERPL-MCNC: 1.38 MG/DL (ref 0.6–1.3)
GLOBULIN SER CALC-MCNC: 3 G/DL (ref 2–4)
GLUCOSE SERPL-MCNC: 85 MG/DL (ref 74–99)
HDLC SERPL-MCNC: 81 MG/DL (ref 40–60)
HDLC SERPL: 2.4 {RATIO} (ref 0–5)
LDLC SERPL CALC-MCNC: 100.6 MG/DL (ref 0–100)
LIPID PROFILE,FLP: ABNORMAL
POTASSIUM SERPL-SCNC: 4.3 MMOL/L (ref 3.5–5.5)
PROT SERPL-MCNC: 7.6 G/DL (ref 6.4–8.2)
PSA SERPL-MCNC: 1.2 NG/ML (ref 0–4)
SODIUM SERPL-SCNC: 137 MMOL/L (ref 136–145)
T4 FREE SERPL-MCNC: 1.1 NG/DL (ref 0.7–1.5)
TRIGL SERPL-MCNC: 57 MG/DL (ref ?–150)
TSH SERPL DL<=0.05 MIU/L-ACNC: 3.39 UIU/ML (ref 0.36–3.74)
VLDLC SERPL CALC-MCNC: 11.4 MG/DL

## 2018-03-22 PROCEDURE — 82306 VITAMIN D 25 HYDROXY: CPT | Performed by: FAMILY MEDICINE

## 2018-03-22 PROCEDURE — 84439 ASSAY OF FREE THYROXINE: CPT | Performed by: FAMILY MEDICINE

## 2018-03-22 PROCEDURE — 80061 LIPID PANEL: CPT | Performed by: FAMILY MEDICINE

## 2018-03-22 PROCEDURE — 86803 HEPATITIS C AB TEST: CPT | Performed by: FAMILY MEDICINE

## 2018-03-22 PROCEDURE — 84443 ASSAY THYROID STIM HORMONE: CPT | Performed by: FAMILY MEDICINE

## 2018-03-22 PROCEDURE — 80053 COMPREHEN METABOLIC PANEL: CPT | Performed by: FAMILY MEDICINE

## 2018-03-22 PROCEDURE — 84153 ASSAY OF PSA TOTAL: CPT | Performed by: FAMILY MEDICINE

## 2018-03-22 RX ORDER — PRAVASTATIN SODIUM 40 MG/1
40 TABLET ORAL
Qty: 90 TAB | Refills: 1 | Status: SHIPPED | OUTPATIENT
Start: 2018-03-22 | End: 2018-10-18 | Stop reason: SDUPTHER

## 2018-03-22 NOTE — MR AVS SNAPSHOT
Alma Samson[address] [facility] [id number] 
[phone] Patient: Toribio Bales MRN: [id number] JYF:[id number] Visit Information Date & Time Provider Department Dept. Phone Encounter #  
 3/22/2018  2:00 PM Deja Bernal, [address] [facility] [phone] [id number] Follow-up Instructions Return if symptoms worsen or fail to improve. Upcoming Health Maintenance Date Due Hepatitis C Screening 1955 DTaP/Tdap/Td series (1 - Tdap) 3/31/1975 ZOSTER VACCINE AGE 60> 1/31/2014 COLONOSCOPY 3/18/2025 Allergies as of 3/22/2018  Review Complete On: 3/22/2018 By: Deja Bernal MD  
  
 Severity Noted Reaction Type Reactions Hayfebrol [Chlorpheniramine-pseudoephed]  10/02/2017    Runny Nose Current Immunizations  Never Reviewed Name Date Influenza Vaccine (Quad) PF 10/4/2017 Not reviewed this visit You Were Diagnosed With   
  
 Codes Comments Routine general medical examination at a health care facility    -  Primary ICD-10-CM: Z00.00 ICD-9-CM: V70.0 Pure hypercholesterolemia     ICD-10-CM: E78.00 ICD-9-CM: 272.0 Degenerative disc disease, lumbar     ICD-10-CM: M51.36 
ICD-9-CM: 722.52 Prostate cancer screening     ICD-10-CM: Z12.5 ICD-9-CM: V76.44 Need for hepatitis C screening test     ICD-10-CM: Z11.59 
ICD-9-CM: V73.89 Advanced care planning/counseling discussion     ICD-10-CM: Z71.89 ICD-9-CM: V65.49 Screening for depression     ICD-10-CM: Z13.89 ICD-9-CM: V79.0 Vitals BP Pulse Temp Resp Height(growth percentile) Weight(growth percentile) 140/87 (BP 1 Location: Left arm, BP Patient Position: Sitting) (!) 55 97 °F (36.1 °C) (Oral) 18 6' (1.829 m) 176 lb (79.8 kg) SpO2 BMI Smoking Status 98% 23.87 kg/m2 Never Smoker Vitals History BMI and BSA Data  Body Mass Index Body Surface Area  
 23.87 kg/m 2 2.01 m 2  
  
  
 Preferred Pharmacy Pharmacy Name Phone [facility] [address] [place] [address] [phone] Your Updated Medication List  
  
   
This list is accurate as of 3/22/18  2:33 PM.  Always use your most recent med list.  
  
  
  
  
 azithromycin 500 mg Tab Commonly known as:  Jendia Hole Take 1 Tab by mouth daily. cholecalciferol (VITAMIN D3) 5,000 unit Tab tablet Commonly known as:  VITAMIN D3 Take  by mouth daily. * FLONASE 50 mcg/actuation nasal spray Generic drug:  fluticasone 2 Sprays by Both Nostrils route daily. * fluticasone 50 mcg/actuation nasal spray Commonly known as:  Marsa Albe 2 Sprays by Both Nostrils route daily. glucosamine sulfate 500 mg capsule Take  by mouth three (3) times daily. Omega-3 Fatty Acids 60- mg Cpdr  
Take  by mouth. * pravastatin 40 mg tablet Commonly known as:  PRAVACHOL  
TAKE 1 TABLET BY MOUTH NIGHTLY * pravastatin 40 mg tablet Commonly known as:  PRAVACHOL Take 1 Tab by mouth nightly. * Notice: This list has 4 medication(s) that are the same as other medications prescribed for you. Read the directions carefully, and ask your doctor or other care provider to review them with you. Prescriptions Sent to Pharmacy Refills  
 pravastatin (PRAVACHOL) 40 mg tablet 1 Sig: Take 1 Tab by mouth nightly. Class: Normal  
 Pharmacy: [facility] [address] [place] [address], [address] [place] [address] #: [phone] Route: Oral  
  
We Performed the Following BEHAV ASSMT W/SCORE & DOCD/STAND INSTRUMENT [id number] CPT(R)] Follow-up Instructions Return if symptoms worsen or fail to improve. Patient Instructions Follow up on lab results in 1-2 days. If you sign up for \"My Chart\" you will be able to see the results online, and if you have any questions you can send me an e-mail. Get your overdue eye exam, have them send us a report. Recheck will depend on labs. Introducing [facility]! [facility] introduces groSolar patient portal. Now you can access parts of your medical record, email your doctor's office, and request medication refills online. 1. In your internet browser, go to [email or web address] 2. Click on the First Time User? Click Here link in the Sign In box. You will see the New Member Sign Up page. 3. Enter your groSolar Access Code exactly as it appears below. You will not need to use this code after youve completed the sign-up process. If you do not sign up before the expiration date, you must request a new code. · groSolar Access Code: [email or web address] Expires: 6/20/2018  2:33 PM 
 
4. Enter the last four digits of your Social Security Number (xxxx) and Date of Birth (mm/dd/yyyy) as indicated and click Submit. You will be taken to the next sign-up page. 5. Create a groSolar ID. This will be your groSolar login ID and cannot be changed, so think of one that is secure and easy to remember. 6. Create a groSolar password. You can change your password at any time. 7. Enter your Password Reset Question and Answer. This can be used at a later time if you forget your password. 8. Enter your e-mail address. You will receive e-mail notification when new information is available in [address] 9. Click Sign Up. You can now view and download portions of your medical record. 10. Click the Download Summary menu link to download a portable copy of your medical information. If you have questions, please visit the Frequently Asked Questions section of the groSolar website. Remember, groSolar is NOT to be used for urgent needs. For medical emergencies, dial 911. Now available from your iPhone and Android! Please provide this summary of care documentation to your next provider. Your primary care clinician is listed as Isiah Wang. If you have any questions after today's visit, please call [phone].

## 2018-03-22 NOTE — PATIENT INSTRUCTIONS
Follow up on lab results in 1-2 days. If you sign up for \"My Chart\" you will be able to see the results online, and if you have any questions you can send me an e-mail. Get your overdue eye exam, have them send us a report. Recheck will depend on labs.

## 2018-03-22 NOTE — PROGRESS NOTES
Rm:1    Chief Complaint   Patient presents with    Complete Physical     Flu Shot Requested: no    Depression Screening:  PHQ over the last two weeks 3/22/2018 3/22/2018 10/2/2017   Little interest or pleasure in doing things Not at all Not at all Not at all   Feeling down, depressed or hopeless Not at all Not at all Not at all   Total Score PHQ 2 0 0 0       Learning Assessment:  Learning Assessment 3/22/2018 10/2/2017   PRIMARY LEARNER Patient Patient   HIGHEST LEVEL OF EDUCATION - PRIMARY LEARNER  [address] [place] [address]   LEARNER PREFERENCE PRIMARY READING DEMONSTRATION   ANSWERED BY patient patient   RELATIONSHIP SELF SELF       Abuse Screening:  No flowsheet data found. Health Maintenance reviewed and discussed per provider: yes     Coordination of Care:    1. Have you been to the ER, urgent care clinic since your last visit? Hospitalized since your last visit? no    2. Have you seen or consulted any other health care providers outside of the [address] [place] [address] since your last visit? Include any pap smears or colon screening.  no

## 2018-03-22 NOTE — PROGRESS NOTES
HISTORY OF PRESENT ILLNESS  Arley Blake is a 60 y.o. male. HPI Comments: Mr. Arpit Milton is here to establish care and get a CPE. He moved here from [place] a couple of years ago. He is very healthy, never smoked, and swims regularly for exercise. He has had a couple of disc surgeries for lumbar discs, doing well, both were \"mini-discectomy\". He is UTD on all HM. Finally, he takes Pravachol for lipids and needs a refill. Complete Physical   Pertinent negatives include no chest pain, no abdominal pain, no headaches and no shortness of breath. Review of Systems   Constitutional: Negative for chills and fever. HENT: Negative for congestion, ear pain and sore throat. Eyes: Negative for discharge and redness. Respiratory: Negative for cough and shortness of breath. Cardiovascular: Negative for chest pain, palpitations and orthopnea. Gastrointestinal: Negative for abdominal pain, nausea and vomiting. Genitourinary: Negative for frequency and urgency. Skin: Negative for rash. Neurological: Negative for weakness and headaches. Endo/Heme/Allergies: Does not bruise/bleed easily. Physical Exam   Constitutional: He is oriented to person, place, and time. He appears well-developed and well-nourished. No distress. HENT:   Head: Normocephalic. Right Ear: External ear normal.   Left Ear: External ear normal.   Eyes: Conjunctivae are normal. Pupils are equal, round, and reactive to light. Neck: Normal range of motion. Neck supple. No thyromegaly present. Cardiovascular: Normal rate, regular rhythm and normal heart sounds. No murmur heard. Pulmonary/Chest: Effort normal and breath sounds normal. No respiratory distress. He has no wheezes. He has no rales. Abdominal: Soft. Bowel sounds are normal. He exhibits no distension and no mass. There is no tenderness. There is no rebound and no guarding.    Genitourinary: Penis normal.   Genitourinary Comments: Testicles normal, no hernia appreciated Musculoskeletal: Normal range of motion. He exhibits no tenderness. Lymphadenopathy:     He has no cervical adenopathy. Neurological: He is alert and oriented to person, place, and time. Coordination normal.   Skin: No rash noted. Psychiatric: He has a normal mood and affect. His behavior is normal.   Nursing note and vitals reviewed. ASSESSMENT and PLAN    ICD-10-CM ICD-9-CM    1. Routine general medical examination at a health care facility Q12.56 F65.3 METABOLIC PANEL, COMPREHENSIVE      LIPID PANEL      PSA, DIAGNOSTIC (PROSTATE SPECIFIC AG)      TSH 3RD GENERATION      T4, FREE      VITAMIN D, 25 HYDROXY      HEPATITIS C AB   2. Pure hypercholesterolemia E78.00 272.0 pravastatin (PRAVACHOL) 40 mg tablet      LIPID PANEL   3. Degenerative disc disease, lumbar M51.36 722.52    4. Prostate cancer screening Z12.5 V76.44 PSA, DIAGNOSTIC (PROSTATE SPECIFIC AG)   5. Need for hepatitis C screening test Z11.59 V73.89 HEPATITIS C AB   6. Advanced care planning/counseling discussion Z71.89 V65.49    7.  Screening for depression Z13.89 V79.0 BEHAV ASSMT W/SCORE & DOCD/STAND INSTRUMENT       AVS instructions reviewed with patient, pt verbalized understanding

## 2018-03-23 LAB
25(OH)D3 SERPL-MCNC: 56.3 NG/ML (ref 30–100)
HCV AB SER IA-ACNC: 0.08 INDEX
HCV AB SERPL QL IA: NEGATIVE
HCV COMMENT,HCGAC: NORMAL

## 2018-03-23 NOTE — PROGRESS NOTES
Call made to pt, both name and  verified. Pt was advised of results and stated that he has a history of his kidney function levels being higher than normal. Pt states that he had kidney function test in the past and it returned normal, however pt states that he is not opposed to having it re-checked again.

## 2018-03-23 NOTE — PROGRESS NOTES
Advise Mr. Albertina Trejo that his labs generally look pretty good-especially his lipids (they are awesome). His kidney function tests were mildly abnormal. This can occur when someone is fasting. Next time he comes in, be sure to be well-hydrated and not fasting and we will repeat the kidney tests.

## 2018-04-24 ENCOUNTER — OFFICE VISIT (OUTPATIENT)
Dept: FAMILY MEDICINE CLINIC | Age: 63
End: 2018-04-24

## 2018-04-24 VITALS
HEIGHT: 72 IN | HEART RATE: 60 BPM | SYSTOLIC BLOOD PRESSURE: 133 MMHG | WEIGHT: 177 LBS | RESPIRATION RATE: 18 BRPM | BODY MASS INDEX: 23.98 KG/M2 | OXYGEN SATURATION: 96 % | TEMPERATURE: 96.8 F | DIASTOLIC BLOOD PRESSURE: 85 MMHG

## 2018-04-24 DIAGNOSIS — S46.911A STRAIN OF RIGHT SHOULDER, INITIAL ENCOUNTER: ICD-10-CM

## 2018-04-24 DIAGNOSIS — J01.01 ACUTE RECURRENT MAXILLARY SINUSITIS: Primary | ICD-10-CM

## 2018-04-24 RX ORDER — PREDNISONE 20 MG/1
40 TABLET ORAL DAILY
Qty: 14 TAB | Refills: 0 | Status: SHIPPED | OUTPATIENT
Start: 2018-04-24 | End: 2018-05-01

## 2018-04-24 RX ORDER — BISMUTH SUBSALICYLATE 262 MG
1 TABLET,CHEWABLE ORAL DAILY
COMMUNITY

## 2018-04-24 RX ORDER — AMOXICILLIN 875 MG/1
875 TABLET, FILM COATED ORAL 2 TIMES DAILY
Qty: 20 TAB | Refills: 0 | Status: SHIPPED | OUTPATIENT
Start: 2018-04-24 | End: 2018-05-04

## 2018-04-24 NOTE — PROGRESS NOTES
HISTORY OF PRESENT ILLNESS  Demi Griffiths is a 58 y.o. male. HPI Comments: Mr. Nanda Barcenas is a 59 yo male, with history of seasonal allergies, who presents today for rhinorrhea, nasal congestion, and right-sided neck/shoulder pain x 1 week and productive cough x 3 days. Patient states symptoms began early last week in response to the increased pollen. He had a sore throat most morning, which he related to post-nasal drip. He had rhinorrhea and sinus congestion and pressure. He used OTC allergy and cold products, and by Friday he was feeling better. Over the weekend, he developed a productive cough, coughing up \"green phlegm. \" He felt subjective fever and chills over the weekend as well. He denies nausea, vomiting, diarrhea, ear pain or fullness. He has also experienced increased right-sided neck and shoulder pain, which he describes as muscular, during this period of illness. He states he has a history of \"shoulder issues\" due to being a swimmer. However, the pain recently has been more significant and causes sleep disturbance. He usually takes Ibuprofen to help with this pain, but has refrained from it while taking the additional cold medication. Cold Symptoms   Associated symptoms include myalgias. Pertinent negatives include no chest pain, no chills, no headaches, no shortness of breath, no nausea and no vomiting. Review of Systems   Constitutional: Negative for chills and fever. HENT: Positive for congestion and sinus pain. Eyes: Negative for blurred vision and double vision. Respiratory: Negative for cough and shortness of breath. Cardiovascular: Negative for chest pain and palpitations. Gastrointestinal: Negative for abdominal pain, nausea and vomiting. Musculoskeletal: Positive for joint pain (shoulder), myalgias and neck pain. Neurological: Negative for headaches. Physical Exam   Constitutional: He appears well-developed and well-nourished.    HENT:   Right Ear: Tympanic membrane, external ear and ear canal normal.   Left Ear: Tympanic membrane, external ear and ear canal normal.   Nose: Nose normal. Right sinus exhibits no maxillary sinus tenderness. Left sinus exhibits no maxillary sinus tenderness. Mouth/Throat: Uvula is midline, oropharynx is clear and moist and mucous membranes are normal. No posterior oropharyngeal erythema. Eyes: Conjunctivae are normal. Pupils are equal, round, and reactive to light. Right conjunctiva is not injected. Left conjunctiva is not injected. Neck: Neck supple. No thyromegaly present. Cardiovascular: Normal rate and regular rhythm. Pulmonary/Chest: Effort normal and breath sounds normal. No respiratory distress. He has no wheezes. He has no rales. Musculoskeletal:   R sided upper back muscles tender. Lymphadenopathy:     He has no cervical adenopathy. Skin: No rash noted. Nursing note and vitals reviewed. ASSESSMENT and PLAN    ICD-10-CM ICD-9-CM    1. Acute recurrent maxillary sinusitis Y37.98 696.8 garlic cap      multivitamin (ONE A DAY) tablet      amoxicillin (AMOXIL) 875 mg tablet      predniSONE (DELTASONE) 20 mg tablet   2.  Strain of right shoulder, initial encounter [id number] 840.9        AVS instructions reviewed with patient, pt verbalized understanding

## 2018-04-24 NOTE — PATIENT INSTRUCTIONS
Take the antibiotic for 10 days. Flush your sinuses daily with Simply Saline or a Neti Pot. Prednisone will help the sinuses and the shoulder muscle. Recheck as needed.

## 2018-04-24 NOTE — MR AVS SNAPSHOT
[address] [address] 
[phone] Patient: Kaila Hollingsworth MRN: [id number] TWM:9[birth year]20/[birth year]04 Visit Information Date & Time Provider Department Dept. Phone Encounter #  
 4/24/2018  3:45 PM Enzo Ley, [address] [facility] [phone] [id number] Follow-up Instructions Return if symptoms worsen or fail to improve. Upcoming Health Maintenance Date Due COLONOSCOPY 3/18/2025 DTaP/Tdap/Td series (2 - Td) 5/15/2026 Allergies as of 4/24/2018  Review Complete On: 4/24/2018 By: Enzo Ley MD  
  
 Severity Noted Reaction Type Reactions Hayfebrol [Chlorpheniramine-pseudoephed]  10/02/2017    Runny Nose Current Immunizations  Reviewed on 3/22/2018 Name Date Influenza Vaccine (Quad) PF 10/4/2017 Tdap 5/15/2016 Zoster Vaccine, Live 11/15/2014 Not reviewed this visit You Were Diagnosed With   
  
 Codes Comments Acute recurrent maxillary sinusitis    -  Primary ICD-10-CM: J01.01 
ICD-9-CM: 461.0 Strain of right shoulder, initial encounter     ICD-10-CM: B30.142M ICD-9-CM: 840.9 Vitals BP Pulse Temp Resp Height(growth percentile) Weight(growth percentile) 133/85 (BP 1 Location: Right arm, BP Patient Position: Sitting) 60 96.8 °F (36 °C) (Oral) 18 6' (1.829 m) 177 lb (80.3 kg) SpO2 BMI Smoking Status 96% 24.01 kg/m2 Never Smoker Vitals History BMI and BSA Data Body Mass Index Body Surface Area 24.01 kg/m 2 2.02 m 2 Preferred Pharmacy Pharmacy Name Phone [facility] [address] [facility], [address] [place] [address] [phone] Your Updated Medication List  
  
   
This list is accurate as of 4/24/18  4:10 PM.  Always use your most recent med list.  
  
  
  
  
 amoxicillin 875 mg tablet Commonly known as:  AMOXIL Take 1 Tab by mouth two (2) times a day for 10 days. cholecalciferol (VITAMIN D3) 5,000 unit Tab tablet Commonly known as:  VITAMIN D3 Take  by mouth daily. * FLONASE 50 mcg/actuation nasal spray Generic drug:  fluticasone 2 Sprays by Both Nostrils route daily. * fluticasone 50 mcg/actuation nasal spray Commonly known as:  Denisha Wayland 2 Sprays by Both Nostrils route daily. garlic Cap Take  by mouth. glucosamine sulfate 500 mg capsule Take  by mouth three (3) times daily. multivitamin tablet Commonly known as:  ONE A DAY Take 1 Tab by mouth daily. Omega-3 Fatty Acids 60- mg Cpdr  
Take  by mouth. * pravastatin 40 mg tablet Commonly known as:  PRAVACHOL  
TAKE 1 TABLET BY MOUTH NIGHTLY * pravastatin 40 mg tablet Commonly known as:  PRAVACHOL Take 1 Tab by mouth nightly. predniSONE 20 mg tablet Commonly known as:  Fatuma Realer Take 2 Tabs by mouth daily for 7 days. * Notice: This list has 4 medication(s) that are the same as other medications prescribed for you. Read the directions carefully, and ask your doctor or other care provider to review them with you. Prescriptions Sent to Pharmacy Refills  
 amoxicillin (AMOXIL) 875 mg tablet 0 Sig: Take 1 Tab by mouth two (2) times a day for 10 days. Class: Normal  
 Pharmacy: [id number] [address] [place] [address] Ph #: [phone] Route: Oral  
 predniSONE (DELTASONE) 20 mg tablet 0 Sig: Take 2 Tabs by mouth daily for 7 days. Class: Normal  
 Pharmacy: [id number] [address] [place] [address] Ph #: [phone] Route: Oral  
  
Follow-up Instructions Return if symptoms worsen or fail to improve. Patient Instructions Take the antibiotic for 10 days. Flush your sinuses daily with Simply Saline or a Neti Pot. Prednisone will help the sinuses and the shoulder muscle. Recheck as needed. Introducing [facility] & HEALTH [facility]! Dear Bettie Chi: Thank you for requesting a [employer] account. Our records indicate that you already have an active [employer] account. You can access your account anytime at [email or web address] Did you know that you can access your hospital and ER discharge instructions at any time in [employer]? You can also review all of your test results from your hospital stay or ER visit. Additional Information If you have questions, please visit the Frequently Asked Questions section of the [employer] website at [email or web address] Remember, [employer] is NOT to be used for urgent needs. For medical emergencies, dial 911. Now available from your iPhone and Android! Please provide this summary of care documentation to your next provider. Your primary care clinician is listed as Isiah Wang. If you have any questions after today's visit, please call [phone].

## 2018-04-24 NOTE — PROGRESS NOTES
Rm:1    Chief Complaint   Patient presents with    Cold Symptoms     coughing up green flem, congestion     Depression Screening:  PHQ over the last two weeks 4/24/2018 4/24/2018 3/22/2018 3/22/2018 10/2/2017   Little interest or pleasure in doing things Not at all Not at all Not at all Not at all Not at all   Feeling down, depressed or hopeless Not at all Not at all Not at all Not at all Not at all   Total Score PHQ 2 0 0 0 0 0       Learning Assessment:  Learning Assessment 3/22/2018 10/2/2017   PRIMARY LEARNER Patient Patient   HIGHEST LEVEL OF EDUCATION - PRIMARY LEARNER  [address] [place] [address]   LEARNER PREFERENCE PRIMARY READING DEMONSTRATION   ANSWERED BY patient patient   RELATIONSHIP SELF SELF       Abuse Screening:  No flowsheet data found. Health Maintenance reviewed and discussed per provider: yes     Coordination of Care:    1. Have you been to the ER, urgent care clinic since your last visit? Hospitalized since your last visit? no    2. Have you seen or consulted any other health care providers outside of the [address] [place] [address] since your last visit? Include any pap smears or colon screening.  no

## 2018-10-18 ENCOUNTER — OFFICE VISIT (OUTPATIENT)
Dept: FAMILY MEDICINE CLINIC | Age: 63
End: 2018-10-18

## 2018-10-18 ENCOUNTER — HOSPITAL ENCOUNTER (OUTPATIENT)
Dept: LAB | Age: 63
Discharge: HOME | End: 2018-10-18
Payer: COMMERCIAL

## 2018-10-18 VITALS
SYSTOLIC BLOOD PRESSURE: 158 MMHG | TEMPERATURE: 96 F | OXYGEN SATURATION: 99 % | BODY MASS INDEX: 24.11 KG/M2 | WEIGHT: 178 LBS | HEART RATE: 49 BPM | HEIGHT: 72 IN | DIASTOLIC BLOOD PRESSURE: 88 MMHG | RESPIRATION RATE: 18 BRPM

## 2018-10-18 DIAGNOSIS — R52 BODY ACHES: ICD-10-CM

## 2018-10-18 DIAGNOSIS — E78.00 PURE HYPERCHOLESTEROLEMIA: ICD-10-CM

## 2018-10-18 DIAGNOSIS — R94.4 ABNORMAL RESULTS OF KIDNEY FUNCTION STUDIES: ICD-10-CM

## 2018-10-18 DIAGNOSIS — Z79.899 HIGH RISK MEDICATION USE: Primary | ICD-10-CM

## 2018-10-18 DIAGNOSIS — Z23 ENCOUNTER FOR IMMUNIZATION: ICD-10-CM

## 2018-10-18 DIAGNOSIS — Z79.899 HIGH RISK MEDICATION USE: ICD-10-CM

## 2018-10-18 LAB
ALBUMIN SERPL-MCNC: 4.6 G/DL (ref 3.4–5)
ALBUMIN/GLOB SERPL: 1.5 {RATIO} (ref 0.8–1.7)
ALP SERPL-CCNC: 75 U/L (ref 45–117)
ALT SERPL-CCNC: 31 U/L (ref 16–61)
ANION GAP SERPL CALC-SCNC: 7 MMOL/L (ref 3–18)
AST SERPL-CCNC: 20 U/L (ref 15–37)
BILIRUB SERPL-MCNC: 0.6 MG/DL (ref 0.2–1)
BUN SERPL-MCNC: 13 MG/DL (ref 7–18)
BUN/CREAT SERPL: 10 (ref 12–20)
CALCIUM SERPL-MCNC: 9.7 MG/DL (ref 8.5–10.1)
CHLORIDE SERPL-SCNC: 103 MMOL/L (ref 100–108)
CHOLEST SERPL-MCNC: 204 MG/DL
CK SERPL-CCNC: 221 U/L (ref 39–308)
CO2 SERPL-SCNC: 31 MMOL/L (ref 21–32)
CREAT SERPL-MCNC: 1.28 MG/DL (ref 0.6–1.3)
GLOBULIN SER CALC-MCNC: 3 G/DL (ref 2–4)
GLUCOSE SERPL-MCNC: 85 MG/DL (ref 74–99)
HDLC SERPL-MCNC: 76 MG/DL (ref 40–60)
HDLC SERPL: 2.7 {RATIO} (ref 0–5)
LDLC SERPL CALC-MCNC: 110.2 MG/DL (ref 0–100)
LIPID PROFILE,FLP: ABNORMAL
POTASSIUM SERPL-SCNC: 4.4 MMOL/L (ref 3.5–5.5)
PROT SERPL-MCNC: 7.6 G/DL (ref 6.4–8.2)
SODIUM SERPL-SCNC: 141 MMOL/L (ref 136–145)
TRIGL SERPL-MCNC: 89 MG/DL (ref ?–150)
VLDLC SERPL CALC-MCNC: 17.8 MG/DL

## 2018-10-18 PROCEDURE — 80061 LIPID PANEL: CPT | Performed by: FAMILY MEDICINE

## 2018-10-18 PROCEDURE — 82550 ASSAY OF CK (CPK): CPT | Performed by: FAMILY MEDICINE

## 2018-10-18 PROCEDURE — 80053 COMPREHEN METABOLIC PANEL: CPT | Performed by: FAMILY MEDICINE

## 2018-10-18 RX ORDER — PRAVASTATIN SODIUM 40 MG/1
40 TABLET ORAL
Qty: 90 TAB | Refills: 1 | Status: SHIPPED | OUTPATIENT
Start: 2018-10-18 | End: 2019-02-18 | Stop reason: SDUPTHER

## 2018-10-18 NOTE — PROGRESS NOTES
Rm:1    Chief Complaint   Patient presents with    Cholesterol Problem     pt presents to office for lab draw to check his cholesterol levels and get refill on Pravastatin    Generalized Body Aches     pt states more so in the shoulders/bilateral     Depression Screening:  PHQ over the last two weeks 10/18/2018 4/24/2018 4/24/2018 3/22/2018 3/22/2018 10/2/2017   Little interest or pleasure in doing things Not at all Not at all Not at all Not at all Not at all Not at all   Feeling down, depressed, irritable, or hopeless Not at all Not at all Not at all Not at all Not at all Not at all   Total Score PHQ 2 0 0 0 0 0 0       Learning Assessment:  Learning Assessment 3/22/2018 10/2/2017   PRIMARY LEARNER Patient Patient   HIGHEST LEVEL OF EDUCATION - PRIMARY LEARNER  [address] [place] [address]   LEARNER PREFERENCE PRIMARY READING DEMONSTRATION   ANSWERED BY patient patient   RELATIONSHIP SELF SELF       Abuse Screening:  No flowsheet data found. Health Maintenance reviewed and discussed per provider: yes     Coordination of Care:    1. Have you been to the ER, urgent care clinic since your last visit? Hospitalized since your last visit? no    2. Have you seen or consulted any other health care providers outside of the [address] [place] [address] since your last visit? Include any pap smears or colon screening. no    Presented for Influenza Vaccine. Administered in right deltoid without complaints. Pt observed for 5 min. No adverse reaction noted.  Pt left office in stable condition

## 2018-10-18 NOTE — PROGRESS NOTES
HISTORY OF PRESENT ILLNESS  Benny Flynn is a 58 y.o. male. Mr. Foreign Neil is here for a refill of his Pravachol and labs. He has been on a statin for many years but recently his prior PCP in [place] switched him to this because it is water soluble and less likely to cause the muscle aches that he was having with prior statins. Interestingly, one of the other reasons he is here today is because of shoulder and thigh aching and body aches in general. He has been attributing it to old age and/or swimming. The pains often affect his sleep. Review of Systems   Constitutional: Negative for chills and fever. Eyes: Negative for blurred vision and double vision. Respiratory: Negative for cough and shortness of breath. Cardiovascular: Negative for chest pain and palpitations. Gastrointestinal: Negative for abdominal pain, nausea and vomiting. Musculoskeletal: Positive for myalgias. Neurological: Negative for headaches. Psychiatric/Behavioral: The patient has insomnia. Physical Exam   Constitutional: He is oriented to person, place, and time. He appears well-developed and well-nourished. Eyes: Pupils are equal, round, and reactive to light. Neck: Neck supple. Cardiovascular: Normal rate, regular rhythm and normal heart sounds. Pulmonary/Chest: Effort normal and breath sounds normal. No respiratory distress. He has no wheezes. He has no rales. Abdominal: Soft. He exhibits no distension. Musculoskeletal: Normal range of motion. Lymphadenopathy:     He has no cervical adenopathy. Neurological: He is alert and oriented to person, place, and time. Psychiatric: He has a normal mood and affect. His behavior is normal.   Nursing note and vitals reviewed. ASSESSMENT and PLAN    ICD-10-CM ICD-9-CM    1. High risk medication use Y38.673 E50.62 METABOLIC PANEL, COMPREHENSIVE      CK   2. Pure hypercholesterolemia E78.00 272.0 pravastatin (PRAVACHOL) 40 mg tablet      LIPID PANEL   3.  Abnormal results of kidney function studies V69.6 216.6 METABOLIC PANEL, COMPREHENSIVE   4. Encounter for immunization Z23 V03.89 INFLUENZA VIRUS VAC QUAD,SPLIT,PRESV FREE SYRINGE IM      [place] IMMUNIZ ADMIN,1 SINGLE/COMB VAC/TOXOID   5.  Body aches R52 780.96 CK

## 2018-10-18 NOTE — PATIENT INSTRUCTIONS
I will send you an e-mail with any recommendations about the lab results. Stop the pravastatin for 10 days or so, see if the aches and pains improve. If they do, U will switch your statin. Recheck 6 months, unless labs dictate otherwise.

## 2018-11-02 ENCOUNTER — TELEPHONE (OUTPATIENT)
Dept: FAMILY MEDICINE CLINIC | Age: 63
End: 2018-11-02

## 2019-02-18 ENCOUNTER — HOSPITAL ENCOUNTER (OUTPATIENT)
Dept: LAB | Age: 64
Discharge: HOME | End: 2019-02-18
Payer: COMMERCIAL

## 2019-02-18 ENCOUNTER — OFFICE VISIT (OUTPATIENT)
Dept: FAMILY MEDICINE CLINIC | Age: 64
End: 2019-02-18

## 2019-02-18 VITALS
TEMPERATURE: 96.2 F | SYSTOLIC BLOOD PRESSURE: 132 MMHG | OXYGEN SATURATION: 100 % | HEIGHT: 72 IN | RESPIRATION RATE: 16 BRPM | DIASTOLIC BLOOD PRESSURE: 90 MMHG | BODY MASS INDEX: 24.03 KG/M2 | HEART RATE: 57 BPM | WEIGHT: 177.4 LBS

## 2019-02-18 DIAGNOSIS — T50.905D ADVERSE EFFECT OF DRUG, SUBSEQUENT ENCOUNTER: ICD-10-CM

## 2019-02-18 DIAGNOSIS — E78.00 PURE HYPERCHOLESTEROLEMIA: Primary | ICD-10-CM

## 2019-02-18 DIAGNOSIS — E78.00 PURE HYPERCHOLESTEROLEMIA: ICD-10-CM

## 2019-02-18 LAB
CHOLEST SERPL-MCNC: 263 MG/DL
HDLC SERPL-MCNC: 74 MG/DL (ref 40–60)
HDLC SERPL: 3.6 {RATIO} (ref 0–5)
LDLC SERPL CALC-MCNC: 171.6 MG/DL (ref 0–100)
LIPID PROFILE,FLP: ABNORMAL
TRIGL SERPL-MCNC: 87 MG/DL (ref ?–150)
VLDLC SERPL CALC-MCNC: 17.4 MG/DL

## 2019-02-18 PROCEDURE — 80061 LIPID PANEL: CPT

## 2019-02-18 NOTE — PROGRESS NOTES
Rm 1  Patient presents to the clinic for a follow-up of his cholesterol. Patient stated muscle soreness has been better. Depression Screening:  3 most recent [facility] Screens 2/18/2019 10/18/2018 4/24/2018 4/24/2018 3/22/2018 3/22/2018 10/2/2017   Little interest or pleasure in doing things Not at all Not at all Not at all Not at all Not at all Not at all Not at all   Feeling down, depressed, irritable, or hopeless Not at all Not at all Not at all Not at all Not at all Not at all Not at all   Total Score PHQ 2 0 0 0 0 0 0 0       Learning Assessment:  Learning Assessment 3/22/2018 10/2/2017   PRIMARY LEARNER Patient Patient   HIGHEST LEVEL OF EDUCATION - PRIMARY LEARNER  [address] [place] [address]   LEARNER PREFERENCE PRIMARY READING DEMONSTRATION   ANSWERED BY patient patient   RELATIONSHIP SELF SELF       Abuse Screening:  No flowsheet data found. Health Maintenance reviewed and discussed per provider: yes     Coordination of Care:    1. Have you been to the ER, urgent care clinic since your last visit? Hospitalized since your last visit? no    2. Have you seen or consulted any other health care providers outside of the [address] [place] [address] since your last visit? Include any pap smears or colon screening.  No

## 2019-02-18 NOTE — PROGRESS NOTES
HISTORY OF PRESENT ILLNESS  Doretha Godwin is a 58 y.o. male. Mr. Devonte Hogan is a 58year old male presenting for follow up on hyperlipidemia and myalgias. He states since stopping pravastatin in October his myalgias have significantly improved. He continues to have shoulder joint pain which he attributes to swimming. He is continuing to take [employer] daily, exercising (swimming) 3x weekly, and eating a Mediterranean diet. He has been checking his blood pressure 2x weekly at [facility]. BP has had mild variability, but generally under 120/80 at all checks. He denies chest pain, shortness of breath, dizziness, and changes in vision. Review of Systems   Constitutional: Negative for chills and fever. Eyes: Negative for blurred vision and double vision. Respiratory: Negative for cough and shortness of breath. Cardiovascular: Negative for chest pain and palpitations. Gastrointestinal: Negative for abdominal pain, nausea and vomiting. Neurological: Negative for headaches. Physical Exam   Constitutional: He is oriented to person, place, and time. He appears well-developed and well-nourished. Eyes: Pupils are equal, round, and reactive to light. Neck: Neck supple. No thyromegaly present. Cardiovascular: Normal rate, regular rhythm and normal heart sounds. Pulmonary/Chest: Effort normal and breath sounds normal. No respiratory distress. He has no wheezes. He has no rales. Abdominal: Soft. He exhibits no distension. Lymphadenopathy:     He has no cervical adenopathy. Neurological: He is alert and oriented to person, place, and time. Nursing note and vitals reviewed. ASSESSMENT and PLAN    ICD-10-CM ICD-9-CM    1. Pure hypercholesterolemia E78.00 272.0 LIPID PANEL   2.  Adverse effect of drug, subsequent encounter T50.905D V58.89

## 2019-02-18 NOTE — PATIENT INSTRUCTIONS
I will send you an e-mail with any recommendations about the lab results.     Recheck for a physical before you switch to medicare

## 2019-02-19 ENCOUNTER — TELEPHONE (OUTPATIENT)
Dept: FAMILY MEDICINE CLINIC | Age: 64
End: 2019-02-19

## 2019-02-19 RX ORDER — EZETIMIBE 10 MG/1
10 TABLET ORAL DAILY
Qty: 90 TAB | Refills: 3 | Status: SHIPPED | OUTPATIENT
Start: 2019-02-19 | End: 2020-03-04 | Stop reason: SDUPTHER

## 2019-02-19 NOTE — TELEPHONE ENCOUNTER
Patient called and wanted to make sure that the prescription of Zetia is a 90 day supply sent over to the [facility] on [address] Adv would forward message

## 2019-02-26 ENCOUNTER — OFFICE VISIT (OUTPATIENT)
Dept: FAMILY MEDICINE CLINIC | Age: 64
End: 2019-02-26

## 2019-02-26 ENCOUNTER — HOSPITAL ENCOUNTER (OUTPATIENT)
Dept: LAB | Age: 64
Discharge: HOME | End: 2019-02-26
Payer: COMMERCIAL

## 2019-02-26 VITALS
WEIGHT: 176 LBS | HEIGHT: 72 IN | SYSTOLIC BLOOD PRESSURE: 137 MMHG | BODY MASS INDEX: 23.84 KG/M2 | RESPIRATION RATE: 18 BRPM | TEMPERATURE: 97.5 F | DIASTOLIC BLOOD PRESSURE: 83 MMHG | HEART RATE: 60 BPM | OXYGEN SATURATION: 100 %

## 2019-02-26 DIAGNOSIS — Z79.899 HIGH RISK MEDICATION USE: ICD-10-CM

## 2019-02-26 DIAGNOSIS — Z12.5 PROSTATE CANCER SCREENING: ICD-10-CM

## 2019-02-26 DIAGNOSIS — Z00.00 ROUTINE GENERAL MEDICAL EXAMINATION AT A HEALTH CARE FACILITY: ICD-10-CM

## 2019-02-26 DIAGNOSIS — E78.00 PURE HYPERCHOLESTEROLEMIA: ICD-10-CM

## 2019-02-26 DIAGNOSIS — Z00.00 ROUTINE GENERAL MEDICAL EXAMINATION AT A HEALTH CARE FACILITY: Primary | ICD-10-CM

## 2019-02-26 LAB
ALBUMIN SERPL-MCNC: 4.5 G/DL (ref 3.4–5)
ALBUMIN/GLOB SERPL: 1.3 {RATIO} (ref 0.8–1.7)
ALP SERPL-CCNC: 89 U/L (ref 45–117)
ALT SERPL-CCNC: 28 U/L (ref 16–61)
ANION GAP SERPL CALC-SCNC: 9 MMOL/L (ref 3–18)
AST SERPL-CCNC: 23 U/L (ref 15–37)
BASOPHILS # BLD: 0 K/UL (ref 0–0.1)
BASOPHILS NFR BLD: 0 % (ref 0–2)
BILIRUB SERPL-MCNC: 0.8 MG/DL (ref 0.2–1)
BILIRUB UR QL STRIP: NEGATIVE
BUN SERPL-MCNC: 15 MG/DL (ref 7–18)
BUN/CREAT SERPL: 11 (ref 12–20)
CALCIUM SERPL-MCNC: 9.6 MG/DL (ref 8.5–10.1)
CHLORIDE SERPL-SCNC: 104 MMOL/L (ref 100–108)
CO2 SERPL-SCNC: 28 MMOL/L (ref 21–32)
CREAT SERPL-MCNC: 1.36 MG/DL (ref 0.6–1.3)
DIFFERENTIAL METHOD BLD: ABNORMAL
EOSINOPHIL # BLD: 0.3 K/UL (ref 0–0.4)
EOSINOPHIL NFR BLD: 3 % (ref 0–5)
ERYTHROCYTE [DISTWIDTH] IN BLOOD BY AUTOMATED COUNT: 14.3 % (ref 11.6–14.5)
GLOBULIN SER CALC-MCNC: 3.5 G/DL (ref 2–4)
GLUCOSE SERPL-MCNC: 90 MG/DL (ref 74–99)
GLUCOSE UR-MCNC: NEGATIVE MG/DL
HCT VFR BLD AUTO: 48.9 % (ref 36–48)
HGB BLD-MCNC: 16.2 G/DL (ref 13–16)
KETONES P FAST UR STRIP-MCNC: NEGATIVE MG/DL
LYMPHOCYTES # BLD: 1.7 K/UL (ref 0.9–3.6)
LYMPHOCYTES NFR BLD: 17 % (ref 21–52)
MCH RBC QN AUTO: 30.9 PG (ref 24–34)
MCHC RBC AUTO-ENTMCNC: 33.1 G/DL (ref 31–37)
MCV RBC AUTO: 93.3 FL (ref 74–97)
MONOCYTES # BLD: 0.8 K/UL (ref 0.05–1.2)
MONOCYTES NFR BLD: 8 % (ref 3–10)
NEUTS SEG # BLD: 7.1 K/UL (ref 1.8–8)
NEUTS SEG NFR BLD: 72 % (ref 40–73)
PH UR STRIP: 5.5 [PH] (ref 4.6–8)
PLATELET # BLD AUTO: 257 K/UL (ref 135–420)
PMV BLD AUTO: 11.6 FL (ref 9.2–11.8)
POTASSIUM SERPL-SCNC: 4.1 MMOL/L (ref 3.5–5.5)
PROT SERPL-MCNC: 8 G/DL (ref 6.4–8.2)
PROT UR QL STRIP: NEGATIVE
PSA SERPL-MCNC: 6.3 NG/ML (ref 0–4)
RBC # BLD AUTO: 5.24 M/UL (ref 4.7–5.5)
SODIUM SERPL-SCNC: 141 MMOL/L (ref 136–145)
SP GR UR STRIP: 1 (ref 1–1.03)
TSH SERPL DL<=0.05 MIU/L-ACNC: 3.9 UIU/ML (ref 0.36–3.74)
UA UROBILINOGEN AMB POC: NORMAL (ref 0.2–1)
URINALYSIS CLARITY POC: CLEAR
URINALYSIS COLOR POC: YELLOW
URINE BLOOD POC: NEGATIVE
URINE LEUKOCYTES POC: NEGATIVE
URINE NITRITES POC: NEGATIVE
WBC # BLD AUTO: 9.9 K/UL (ref 4.6–13.2)

## 2019-02-26 PROCEDURE — 84153 ASSAY OF PSA TOTAL: CPT

## 2019-02-26 PROCEDURE — 80053 COMPREHEN METABOLIC PANEL: CPT

## 2019-02-26 PROCEDURE — 85025 COMPLETE CBC W/AUTO DIFF WBC: CPT

## 2019-02-26 PROCEDURE — 84443 ASSAY THYROID STIM HORMONE: CPT

## 2019-02-26 RX ORDER — ASPIRIN 81 MG/1
TABLET ORAL DAILY
COMMUNITY
End: 2019-06-11

## 2019-02-26 NOTE — PATIENT INSTRUCTIONS
I will send you an e-mail with any recommendations about the lab results. When you get your medicare, in the first year you get what's called a \"Welcome to Medicare\". The whole point of that is to update a patient's health maintenance items, get an Advanced Medical Directive, etc. That does include an EKG though.

## 2019-02-26 NOTE — PROGRESS NOTES
HISTORY OF PRESENT ILLNESS  Gisella Stoddard is a 58 y.o. male. Mr. Bernard is here for his annual CPE before he loses his insurance in 4 days. His last CPE was 3/22/18 but he believes it's a calendar year CPE that he can get. His lipids were done last week. He exercises regularly, doesn't smoke and has an occasional alcohol drink. Review of Systems   Constitutional: Negative for chills and fever. HENT: Negative for congestion, ear pain and sore throat. Eyes: Negative for discharge and redness. Respiratory: Negative for cough and shortness of breath. Cardiovascular: Negative for chest pain, palpitations and orthopnea. Gastrointestinal: Negative for abdominal pain, nausea and vomiting. Genitourinary: Negative for frequency and urgency. Skin: Negative for rash. Neurological: Negative for weakness and headaches. Endo/Heme/Allergies: Does not bruise/bleed easily. Physical Exam   Constitutional: He is oriented to person, place, and time. He appears well-developed and well-nourished. No distress. HENT:   Head: Normocephalic. Right Ear: External ear normal.   Left Ear: External ear normal.   Eyes: Conjunctivae are normal. Pupils are equal, round, and reactive to light. Neck: Normal range of motion. Neck supple. No thyromegaly present. Cardiovascular: Normal rate, regular rhythm and normal heart sounds. No murmur heard. Pulmonary/Chest: Effort normal and breath sounds normal. No respiratory distress. He has no wheezes. He has no rales. Abdominal: Soft. Bowel sounds are normal. He exhibits no distension and no mass. There is no tenderness. There is no rebound and no guarding. Genitourinary: Penis normal.   Genitourinary Comments: Testicles normal, no hernia appreciated   Musculoskeletal: Normal range of motion. He exhibits no tenderness. Lymphadenopathy:     He has no cervical adenopathy. Neurological: He is alert and oriented to person, place, and time.  Coordination normal. Skin: No rash noted. Psychiatric: He has a normal mood and affect. His behavior is normal.   Nursing note and vitals reviewed. ASSESSMENT and PLAN    ICD-10-CM ICD-9-CM    1. Routine general medical examination at a health care facility Z00.00 V70.0 CBC WITH AUTOMATED DIFF      METABOLIC PANEL, COMPREHENSIVE      TSH 3RD GENERATION      AMB POC URINALYSIS DIP STICK AUTO W/O MICRO      PSA SCREENING (SCREENING)   2. Pure hypercholesterolemia E78.00 272.0    3. High risk medication use Z79.899 V58.69 CBC WITH AUTOMATED DIFF      METABOLIC PANEL, COMPREHENSIVE   4.  Prostate cancer screening Z12.5 V76.44 PSA SCREENING (SCREENING)

## 2019-02-26 NOTE — PROGRESS NOTES
Rm:2    Chief Complaint   Patient presents with    Physical     Depression Screening:  3 most recent [facility] Screens 2/26/2019 2/18/2019 10/18/2018 4/24/2018 4/24/2018 3/22/2018 3/22/2018   Little interest or pleasure in doing things Not at all Not at all Not at all Not at all Not at all Not at all Not at all   Feeling down, depressed, irritable, or hopeless Not at all Not at all Not at all Not at all Not at all Not at all Not at all   Total Score PHQ 2 0 0 0 0 0 0 0       Learning Assessment:  Learning Assessment 3/22/2018 10/2/2017   PRIMARY LEARNER Patient Patient   HIGHEST LEVEL OF EDUCATION - PRIMARY LEARNER  [address] [place] [address]   LEARNER PREFERENCE PRIMARY READING DEMONSTRATION   ANSWERED BY patient patient   RELATIONSHIP SELF SELF       Abuse Screening:  No flowsheet data found. Health Maintenance reviewed and discussed per provider: yes     Coordination of Care:    1. Have you been to the ER, urgent care clinic since your last visit? Hospitalized since your last visit? no    2. Have you seen or consulted any other health care providers outside of the [address] [place] [address] since your last visit? Include any pap smears or colon screening.  no

## 2019-06-04 ENCOUNTER — OFFICE VISIT (OUTPATIENT)
Dept: FAMILY MEDICINE CLINIC | Age: 64
End: 2019-06-04

## 2019-06-04 ENCOUNTER — HOSPITAL ENCOUNTER (OUTPATIENT)
Dept: LAB | Age: 64
Discharge: HOME | End: 2019-06-04
Payer: MEDICARE

## 2019-06-04 VITALS
HEART RATE: 63 BPM | DIASTOLIC BLOOD PRESSURE: 78 MMHG | SYSTOLIC BLOOD PRESSURE: 133 MMHG | TEMPERATURE: 96.7 F | WEIGHT: 176.2 LBS | HEIGHT: 72 IN | OXYGEN SATURATION: 99 % | RESPIRATION RATE: 16 BRPM | BODY MASS INDEX: 23.86 KG/M2

## 2019-06-04 DIAGNOSIS — R97.20 ELEVATED PSA: Primary | ICD-10-CM

## 2019-06-04 DIAGNOSIS — Z23 NEED FOR VACCINATION FOR PNEUMOCOCCUS: ICD-10-CM

## 2019-06-04 DIAGNOSIS — B07.8 OTHER VIRAL WARTS: ICD-10-CM

## 2019-06-04 DIAGNOSIS — E78.00 PURE HYPERCHOLESTEROLEMIA: ICD-10-CM

## 2019-06-04 DIAGNOSIS — Z00.00 WELCOME TO MEDICARE PREVENTIVE VISIT: ICD-10-CM

## 2019-06-04 DIAGNOSIS — Z71.89 ADVANCED CARE PLANNING/COUNSELING DISCUSSION: ICD-10-CM

## 2019-06-04 DIAGNOSIS — R97.20 ELEVATED PSA: ICD-10-CM

## 2019-06-04 LAB
CHOLEST SERPL-MCNC: 212 MG/DL
HDLC SERPL-MCNC: 76 MG/DL (ref 40–60)
HDLC SERPL: 2.8 {RATIO} (ref 0–5)
LDLC SERPL CALC-MCNC: 124.2 MG/DL (ref 0–100)
LIPID PROFILE,FLP: ABNORMAL
PSA SERPL-MCNC: 1.3 NG/ML (ref 0–4)
TRIGL SERPL-MCNC: 59 MG/DL (ref ?–150)
VLDLC SERPL CALC-MCNC: 11.8 MG/DL

## 2019-06-04 PROCEDURE — 84153 ASSAY OF PSA TOTAL: CPT

## 2019-06-04 PROCEDURE — 80061 LIPID PANEL: CPT

## 2019-06-04 PROCEDURE — 36415 COLL VENOUS BLD VENIPUNCTURE: CPT

## 2019-06-04 RX ORDER — LANOLIN ALCOHOL/MO/W.PET/CERES
1 CREAM (GRAM) TOPICAL DAILY
COMMUNITY
End: 2019-06-11

## 2019-06-04 RX ORDER — MINERAL OIL
ENEMA (ML) RECTAL
COMMUNITY

## 2019-06-04 NOTE — PROGRESS NOTES
*ATTENTION:  This note has been created by a medical student for educational purposes only. Please do not refer to the content of this note for clinical decision-making, billing, or other purposes. Please see attending physicians note to obtain clinical information on this patient. *   Mr. Bozena Alexandra is a 71year old male with pmhx of Hyperlipidemia  presents today to the clinic to establish primary care. He was seen recently by Dr. Claudia Yang for his primary care, where he was found to have an elevated PSA. He has since been seen by a Urologist who has requested a repeat PSA done prior to his appointment on 6/11. He also would like to get his lipid levels measured again because he switched from Pravastatin to Ezetimibe. This was done because he developed myalgia, which improved after stopping statin therapy. He also says he hads warts on his finger and would like to get them removed. Otherwise no other complaints. Medications: Ezetimibe 10mg  Fluticasone 50mcg nasal spray    Surgical history:  1979, 1996 2013 Back surgeries: ruptured disc    Social History  Retired, now lives with wife here in [place]. Helps take care of his grandchildren. Alcohol 2 drinks per day. Healthy diet. Does swimming for exercise frequently. No tobacco use. No other drug use. Allergies: Pravastatin, myalgia    ROS:  General: no fevers, chills, changes in weight  HEENT: no changes in vision or changes in hearing  CV: no chestpain, no palpitations  Pulm: no SOB, dyspnea on exertion, cough  GI: no changes in bowel function, no pain  : no dysuria, hematuria, nocturnal polyuria  MSK: no joint pain, no muscle pain  Neuro: no changes in memory, no numbness or tingling.  Endorses Raynaud's phenomenon  Psych: no depression or anxiety    PE:  Patient Vitals for the past 12 hrs:   Temp Pulse Resp BP SpO2   06/04/19 0932 -- -- -- 133/78 --   06/04/19 0928 96.7 °F (35.9 °C) 63 16 133/83 99 %     PE:  General: pleasant elderly gentlemen in no acute distress  HEENT: PERRL, neck supple, no thyromegaly, no LAD  CV: normal s1, s2, no m/r/g  Lungs: CTAB, normal respiratory effort  Extremities: 2+ dp pulses, no edema  Skin: 2 warts on left 1st and 2nd fingers next to nail beds  MSK: 5/5 strength both extremities, normal muscle bulk and tone  Neuro: 2+ DTR bilaterally, 5/5 sensation bilaterally to touch    Assessment   71year old male presenting for lipid level and PSA check. Also concern of warts on fingers. Plan:  1. HLD: obtain lipid studies today, will decide change in medications based off lipid levels. Encouraged to continue healthy diet and exercise. 2. PSA: obtain level, f/u with urology    3. Skin warts: treated in office with liquid nitrogen, instructed for safe removal at home with either duct tape or emeryboard nail file. 4. Preventative care: Instructed to get outpatient new shingles vaccine. Willing to get prevnar vaccine today. Up to date with colonoscopy.      May Torre, MS3  [facility]

## 2019-06-04 NOTE — PROGRESS NOTES
This is a \"Welcome to [employer]"  Initial Preventive Physical Examination (IPPE) providing Personalized Prevention Plan Services (Performed in the first 12 months of enrollment)    Arpita Stock is a 71 y.o.  male and presents for a welcome to Medicare physical exam     I have reviewed the patient's medical history in detail and updated the computerized patient record. History     Past Medical History:   Diagnosis Date    Hypercholesterolemia       Past Surgical History:   Procedure Laterality Date    HX ORTHOPAEDIC  2013    back surgery     Current Outpatient Medications   Medication Sig Dispense Refill    KRILL OIL PO Take  by mouth.  coenzyme q10 (CO Q-10) 300 mg cap Take  by mouth.  glucosamine-chondroitin (ARTHX) 500-400 mg cap Take 1 Cap by mouth daily.  ezetimibe (ZETIA) 10 mg tablet Take 1 Tab by mouth daily. 90 Tab 3    garlic cap Take  by mouth.  multivitamin (ONE A DAY) tablet Take 1 Tab by mouth daily.  fluticasone (FLONASE) 50 mcg/actuation nasal spray 2 Sprays by Both Nostrils route daily. 1 Bottle 0    cholecalciferol, VITAMIN D3, (VITAMIN D3) 5,000 unit tab tablet Take  by mouth daily.  aspirin delayed-release 81 mg tablet Take  by mouth daily.  Omega-3 Fatty Acids 60- mg cpDR Take  by mouth.        Allergies   Allergen Reactions    Hayfebrol [Chlorpheniramine-Pseudoephed] Runny Nose    Pravastatin Myalgia     Family History   Problem Relation Age of Onset    Cancer Father         Liver    Heart Disease Father     Heart Attack Father     Hypertension Sister     Hypertension Brother     Arthritis-rheumatoid Mother      Social History     Tobacco Use    Smoking status: Never Smoker    Smokeless tobacco: Never Used   Substance Use Topics    Alcohol use: Yes     Comment: 2 drinks per day       generally follows a low fat low cholesterol diet, generally follows a low sodium diet, does not rigorously follow a diabetic diet, exercises regularly, nonsmoker    very active    Health Maintenance History  Immunizations reviewed, dtap due , pneumovax up to date, flu up to date, zoster due  Colonoscopy: up to date,   Eye exam: due    Depression Risk Factor Screening:      Patient Health Questionnaire (PHQ-2)   Over the last 2 weeks, how often have you been bothered by any of the following problems? · Little interest or pleasure in doing things? · Not at all. [0]  · Feeling down, depressed, or hopeless? · Not at all. [0]    Total Score: 0/6  PHQ-2 Assessment Scoring:   A score of 2 or more requires further screening with the PHQ-9    Alcohol Risk Factor Screening:     Men: On any occasion during the past 3 months, have you had more than 4 drinks containing alcohol?  no  Do you average more than 14 drinks per week?  no    Functional Ability and Level of Safety:     Hearing Loss    The patient needs further evaluation. Activities of Daily Living   Self-care. Requires assistance with: no ADLs    Fall Risk   No fall risk factors    Abuse Screen   Patient is not abused    Review of Systems   ROS   Constitutional: Negative for chills and fever. HENT: Negative for congestion, ear pain and sore throat. Eyes: Negative for discharge and redness. Respiratory: Negative for cough and shortness of breath. Cardiovascular: Negative for chest pain, palpitations and orthopnea. Gastrointestinal: Negative for abdominal pain, nausea and vomiting. Genitourinary: Negative for frequency and urgency. Skin: Negative for rash. Neurological: Negative for weakness and headaches. Endo/Heme/Allergies: Does not bruise/bleed easily. All other systems reviewed and are negative.       Examination   Physical Examination  Vitals:    06/04/19 0928 06/04/19 0932   BP: 133/83 133/78   Pulse: 63    Resp: 16    Temp: 96.7 °F (35.9 °C)    TempSrc: Oral    SpO2: 99%    Weight: 176 lb 3.2 oz (79.9 kg)    Height: 6' (1.829 m)    PainSc:   0 - No pain       Body mass index is 23.9 kg/m². Visual Acuity: Corrected:            L  20/15            R 20/20  EKG Screening: not done. End-of-life planning  Advanced Directive in the case than an injury or illness causes the patient to be unable to make health care decisions  Health Care Directive or Living Will: yes    Assessment/Plan:   Education and counseling provided:  Are appropriate based on today's review and evaluation  End-of-Life planning (with patient's consent)  Colorectal cancer screening tests  Cardiovascular screening blood test  Diabetes screening test    ICD-10-CM ICD-9-CM    1. Elevated PSA R97.20 790.93 PSA, DIAGNOSTIC (PROSTATE SPECIFIC AG)   2. Other viral warts B07.8 078.19 DESTRUC BENIGN LESION, UP TO 14 LESIONS   3. Pure hypercholesterolemia E78.00 272.0 LIPID PANEL   4. Welcome to Medicare preventive visit Z00.00 V70.0 [address] [employer]   5. Need for vaccination for pneumococcus Z23 V03.82 ADMIN PNEUMOCOCCAL VACCINE      PNEUMOCOCCAL CONJ VACCINE 13 VALENT IM   . Brief written plan, checklist    I have discussed the diagnosis with the patient and the intended plan as seen in the above orders. The patient has received an after-visit summary and questions were answered concerning future plans. I have discussed medication side effects and warnings with the patient as well. I have reviewed the plan of care with the patient, accepted their input and they are in agreement with the treatment goals. ____________________________________________________________    Problem Assessment    for treatment of   Chief Complaint   Patient presents with    Labs     Lipids, PSA         SUBJECTIVE    Well Adult Physical   Patient here for a comprehensive physical exam.The patient reports problems - hypercholesterolemia, allergic rhinitis; Left 2nd and 3rd finger lesion/ wart which has been present for months  Do you take any herbs or supplements that were not prescribed by a doctor?  yes Are you taking calcium supplements? yes Are you taking aspirin daily? not applicable    Cardiovascular Review:  The patient has hyperlipidemia. Diet and Lifestyle: generally follows a low fat low cholesterol diet, generally follows a low sodium diet, does not rigorously follow a diabetic diet, exercises regularly, nonsmoker  Home BP Monitoring: is not measured at home. Pertinent ROS: taking medications as instructed, no medication side effects noted, no TIA's, no chest pain on exertion, no dyspnea on exertion, no swelling of ankles. Visit Vitals  /78 (BP 1 Location: Right arm, BP Patient Position: Sitting)   Pulse 63   Temp 96.7 °F (35.9 °C) (Oral)   Resp 16   Ht 6' (1.829 m)   Wt 176 lb 3.2 oz (79.9 kg)   SpO2 99%   BMI 23.90 kg/m²     General:  Alert, cooperative, no distress, appears stated age. Head:  Normocephalic, without obvious abnormality, atraumatic. Eyes:  Conjunctivae/corneas clear. PERRL, EOMs intact. Ears:  Normal TMs and external ear canals both ears. Nose: Nares normal. Septum midline. Mucosa normal. No drainage or sinus tenderness. Throat: Lips, mucosa, and tongue normal. Teeth and gums normal.   Neck: Supple, symmetrical, trachea midline, no adenopathy, thyroid: no enlargement/tenderness/nodules   Back:   Symmetric, no curvature. ROM normal.    Lungs:   Clear to auscultation bilaterally. Chest wall:  No tenderness or deformity. Heart:  Regular rate and rhythm, S1, S2 normal, no murmur, click, rub or gallop. Abdomen:   Soft, non-tender. Bowel sounds normal. No masses,  No organomegaly. Genitalia:  deferred   Rectal:  deferred   Extremities: Extremities normal, atraumatic, no cyanosis or edema. Pulses: 2+ and symmetric all extremities. Skin: Wart on fingers; skin color, texture, turgor normal. No rashes or lesions   Lymph nodes: Cervical, supraclavicular, and axillary nodes normal.   Neurologic: CNII-XII intact. Normal strength, sensation and reflexes throughout.    We discussed this procedure, including option of not performing surgery, technique of surgery and potential for scarring at a recent visit. After informed consent was obtained, with sterile technique, cryotherapy with liquid nitrogen was performed. Wound care instructions provided. Be alert for any signs of cutaneous infection. The procedure was well tolerated without complications. Follow up: the patient may return prn. Assessment/Plan:    1. Elevated PSA    - PSA, DIAGNOSTIC (PROSTATE SPECIFIC AG); Future    2. Other viral warts    - DESTRUC BENIGN LESION, UP TO 14 LESIONS    3. Pure hypercholesterolemia  Assess for control of disease  - LIPID PANEL; Future    4. Welcome to Medicare preventive visit  Reviewed preventive recommendations  - [address] [employer]    5. Need for vaccination for pneumococcus    - ADMIN PNEUMOCOCCAL VACCINE  - PNEUMOCOCCAL CONJ VACCINE 13 VALENT IM    6.  Advanced care planning/counseling discussion  See ACP        Lab review: orders written for new lab studies as appropriate; see orders

## 2019-06-04 NOTE — PROGRESS NOTES
Chief Complaint   Patient presents with    Labs     1. Have you been to the ER, urgent care clinic since your last visit? Hospitalized since your last visit? No    2. Have you seen or consulted any other health care providers outside of the [address] [place] [address] since your last visit? Include any pap smears or colon screening.  No

## 2019-06-15 NOTE — ACP (ADVANCE CARE PLANNING)
Advance Care Planning (ACP) Provider Note - Comprehensive     Date of ACP Conversation: 06/04/19  Persons included in Conversation:  patient  Length of ACP Conversation in minutes:  <16 minutes (Non-Billable)    Authorized Decision Maker (if patient is incapable of making informed decisions): This person is:  his wife            General ACP for ALL Patients with Decision Making Capacity:   Importance of advance care planning, including choosing a healthcare agent to communicate patient's healthcare decisions if patient lost the ability to make decisions, such as after a sudden illness or accident  Understanding of the healthcare agent role was assessed and information provided  Exploration of values, goals, and preferences if recovery is not expected, even with continued medical treatment in the event of: Imminent death  Severe, permanent brain injury  \"In these circumstances, what matters most to you? \"  Care focused more on comfort or quality of life. \"What, if any, treatments would you want to avoid? \" none  Opportunity offered to explore how cultural, Orthodoxy, spiritual, or personal beliefs would affect decisions for future care     Review of Existing Advance Directive:  What information were you given about medical decisions to consider before completing your advance directive? conversation starter    For Serious or Chronic Illness:  Understanding of medical condition      Interventions Provided:  Recommended completion of Advance Directive form after review of ACP materials and conversation with prospective healthcare agent

## 2020-03-04 DIAGNOSIS — E78.00 PURE HYPERCHOLESTEROLEMIA: ICD-10-CM

## 2020-03-05 NOTE — TELEPHONE ENCOUNTER
Pt called in and informed me that he is going ou to [facility] on Saturday and needs this medication before Saturday morning if possible. Please advise.

## 2020-03-06 RX ORDER — EZETIMIBE 10 MG/1
10 TABLET ORAL DAILY
Qty: 90 TAB | Refills: 3 | Status: SHIPPED | OUTPATIENT
Start: 2020-03-06 | End: 2021-02-17

## 2020-06-15 DIAGNOSIS — R97.20 ELEVATED PSA: ICD-10-CM

## 2020-06-15 DIAGNOSIS — E78.00 PURE HYPERCHOLESTEROLEMIA: Primary | ICD-10-CM

## 2020-06-19 ENCOUNTER — HOSPITAL ENCOUNTER (OUTPATIENT)
Dept: LAB | Age: 65
Discharge: HOME | End: 2020-06-19
Payer: MEDICARE

## 2020-06-19 DIAGNOSIS — R97.20 ELEVATED PSA: ICD-10-CM

## 2020-06-19 DIAGNOSIS — E78.00 PURE HYPERCHOLESTEROLEMIA: ICD-10-CM

## 2020-06-19 LAB
CHOLEST SERPL-MCNC: 206 MG/DL
HDLC SERPL-MCNC: 66 MG/DL (ref 40–60)
HDLC SERPL: 3.1 {RATIO} (ref 0–5)
LDLC SERPL CALC-MCNC: 127.8 MG/DL (ref 0–100)
LIPID PROFILE,FLP: ABNORMAL
PSA SERPL-MCNC: 1.3 NG/ML (ref 0–4)
TRIGL SERPL-MCNC: 61 MG/DL (ref ?–150)
VLDLC SERPL CALC-MCNC: 12.2 MG/DL

## 2020-06-19 PROCEDURE — 84153 ASSAY OF PSA TOTAL: CPT

## 2020-06-19 PROCEDURE — 36415 COLL VENOUS BLD VENIPUNCTURE: CPT

## 2020-06-19 PROCEDURE — 80061 LIPID PANEL: CPT

## 2020-06-22 ENCOUNTER — OFFICE VISIT (OUTPATIENT)
Dept: FAMILY MEDICINE CLINIC | Age: 65
End: 2020-06-22

## 2020-06-22 VITALS
WEIGHT: 185 LBS | HEIGHT: 72 IN | HEART RATE: 71 BPM | DIASTOLIC BLOOD PRESSURE: 81 MMHG | TEMPERATURE: 97.5 F | OXYGEN SATURATION: 98 % | SYSTOLIC BLOOD PRESSURE: 137 MMHG | RESPIRATION RATE: 16 BRPM | BODY MASS INDEX: 25.06 KG/M2

## 2020-06-22 DIAGNOSIS — Z23 NEED FOR SHINGLES VACCINE: ICD-10-CM

## 2020-06-22 DIAGNOSIS — Z71.89 ADVANCE CARE PLANNING: ICD-10-CM

## 2020-06-22 DIAGNOSIS — R35.1 NOCTURIA: Primary | ICD-10-CM

## 2020-06-22 DIAGNOSIS — Z00.00 MEDICARE ANNUAL WELLNESS VISIT, INITIAL: ICD-10-CM

## 2020-06-22 DIAGNOSIS — Z23 NEED FOR VACCINATION AGAINST STREPTOCOCCUS PNEUMONIAE: ICD-10-CM

## 2020-06-22 DIAGNOSIS — R79.89 ABNORMAL TSH: ICD-10-CM

## 2020-06-22 DIAGNOSIS — E78.00 PURE HYPERCHOLESTEROLEMIA: ICD-10-CM

## 2020-06-22 NOTE — PROGRESS NOTES
(AWV) The initial Medicare Annual Wellness Exam PROGRESS NOTE    This is a initial Medicare Annual Wellness Exam (AWV)     I have reviewed the patient's medical history in detail and updated the computerized patient record. Jose M Jose is a 76 y.o.  male and presents for an annual wellness exam       ROS   Constitutional: Negative for chills and fever. HENT: Negative for congestion, ear pain and sore throat.  he had allergy symptoms 2 months ago and used benadryl with good results. Eyes: Negative for discharge and redness. Respiratory: Negative for cough and shortness of breath.    Cardiovascular: Negative for chest pain, palpitations and orthopnea. Gastrointestinal: Negative for abdominal pain, nausea and vomiting. Genitourinary: Negative for frequency and urgency. Skin: Negative for rash. Neurological: Negative for weakness and headaches. msk : positive for shoulder pain but this has improved with less swimming  Endo/Heme/Allergies: Does not bruise/bleed easily.      All other systems reviewed and are negative. History     Past Medical History:   Diagnosis Date    BPH associated with nocturia     Elevated PSA     Hypercholesterolemia       Past Surgical History:   Procedure Laterality Date    HX ORTHOPAEDIC  2013    back surgery     Current Outpatient Medications   Medication Sig Dispense Refill    ezetimibe (ZETIA) 10 mg tablet Take 1 Tab by mouth daily. 90 Tab 3    glucosamine-chondroitin (ARTHX) 500-400 mg cap Take 1 Cap by mouth daily.  KRILL OIL PO Take  by mouth.  coenzyme q10 (CO Q-10) 300 mg cap Take  by mouth.  fexofenadine (ALLEGRA) 180 mg tablet Take  by mouth.  garlic cap Take  by mouth.  multivitamin (ONE A DAY) tablet Take 1 Tab by mouth daily.  fluticasone (FLONASE) 50 mcg/actuation nasal spray 2 Sprays by Both Nostrils route daily. 1 Bottle 0    cholecalciferol, VITAMIN D3, (VITAMIN D3) 5,000 unit tab tablet Take  by mouth daily. Allergies   Allergen Reactions    Hayfebrol [Chlorpheniramine-Pseudoephed] Runny Nose     NOT ALLERGIC TO THIS PER PATIENT.  Pravastatin Myalgia     NOT ALLERGIC TO THIS PER PATIENT. Family History   Problem Relation Age of Onset    Cancer Father         Liver    Heart Disease Father     Heart Attack Father     Hypertension Sister     Hypertension Brother     Arthritis-rheumatoid Mother      Social History     Tobacco Use    Smoking status: Never Smoker    Smokeless tobacco: Never Used   Substance Use Topics    Alcohol use: Yes     Comment: 2 drinks per day     Patient Active Problem List   Diagnosis Code    Hypercholesteremia E78.00    Advanced care planning/counseling discussion Z71.89    Degenerative disc disease, lumbar M51.36    Negative depression screening Z13.31       Health Maintenance History  Immunizations reviewed, dtap up to date , pneumovax due, flu up to date, zoster due  Colonoscopy: up to date,   Eye exam:up to date    Depression Risk Factor Screening:      Patient Health Questionnaire (PHQ-2)   Over the last 2 weeks, how often have you been bothered by any of the following problems? · Little interest or pleasure in doing things? · Not at all. [0]  · Feeling down, depressed, or hopeless? · Not at all. [0]    Total Score: 0/6  PHQ-2 Assessment Scoring:   A score of 2 or more requires further screening with the PHQ-9    Alcohol Risk Factor Screening:     Men: On any occasion during the past 3 months, have you had more than 4 drinks containing alcohol?  no  Do you average more than 14 drinks per week?  no    Functional Ability and Level of Safety:     Hearing Loss    The patient needs further evaluation. Activities of Daily Living   Self-care.    Requires assistance with: no ADLs    Fall Risk   No fall risk factors    Abuse Screen   Patient is not abused    Examination   Physical Examination  Vitals:    06/22/20 1138   BP: 137/81   Pulse: 71   Resp: 16   Temp: 97.5 °F (36.4 °C)   TempSrc: Oral   SpO2: 98%   Weight: 185 lb (83.9 kg)   Height: 6' (1.829 m)   PainSc:   0 - No pain      Body mass index is 25.09 kg/m². Evaluation of Cognitive Function:  Mood/affect:good mood with appropriate affect  Appearance:well kempt  Family member/caregiver input: n/a    alert, well appearing, and in no distress, oriented to person, place, and time and normal appearing weight    Patient Care Team:  Nehal Regalado MD as PCP - General (Family Practice)  Nehal Regalado MD as PCP - [address] [place] [address] Dr Morrell Provider    End-of-life planning  Advanced Directive in the case than an injury or illness causes the patient to be unable to make health care decisions    Health Care Directive or Living Will: no    Advice/Referrals/Counselling/Plan:   Education and counseling provided:  End-of-Life planning (with patient's consent)  Pneumococcal Vaccine  Diabetes screening test  zoster  Include in education list (weight loss, physical activity, smoking cessation, fall prevention, and nutrition)    ICD-10-CM ICD-9-CM    1. Nocturia A73.1 272.99 METABOLIC PANEL, BASIC   2. Medicare annual wellness visit, initial Z00.00 V70.0 [address]   3. Advance care planning Z71.89 V65.49 ADVANCE CARE PLANNING FIRST 30 MINS   4. Need for vaccination against Streptococcus pneumoniae Z23 V03.82 ADMIN PNEUMOCOCCAL VACCINE      PNEUMOCOCCAL POLYSACCHARIDE VACCINE, 23-VALENT, ADULT OR IMMUNOSUPPRESSED PT DOSE,   5. Need for shingles vaccine Z23 V04.89 ZOSTER VACC RECOMBINANT ADJUVANTED   6. Abnormal TSH R79.89 790.6 TSH 3RD GENERATION   7. Pure hypercholesterolemia E78.00 272.0 TSH 3RD GENERATION   . Brief written plan, checklist    I have discussed the diagnosis with the patient and the intended plan as seen in the above orders. The patient has received an after-visit summary and questions were answered concerning future plans. I have discussed medication side effects and warnings with the patient as well. I have reviewed the plan of care with the patient, accepted their input and they are in agreement with the treatment goals. ____________________________________________________________    Problem Assessment    for treatment of   Chief Complaint   Patient presents with    Complete Physical       SUBJECTIVE    Well Adult Physical   Patient here for a comprehensive physical exam.The patient reports problems - hypercholesterolemia, shoulder pain  Do you take any herbs or supplements that were not prescribed by a doctor? no Are you taking calcium supplements? no Are you taking aspirin daily? not applicable    Cardiovascular Review:  The patient has hyperlipidemia. Diet and Lifestyle: generally follows a low fat low cholesterol diet, generally follows a low sodium diet, follows a diabetic diet regularly, exercises regularly, nonsmoker  Home BP Monitoring: is not measured at home. Pertinent ROS: taking medications as instructed, no medication side effects noted, no TIA's, no chest pain on exertion, no dyspnea on exertion, no swelling of ankles. Visit Vitals  /81 (BP 1 Location: Right arm, BP Patient Position: Sitting)   Pulse 71   Temp 97.5 °F (36.4 °C) (Oral)   Resp 16   Ht 6' (1.829 m)   Wt 185 lb (83.9 kg)   SpO2 98%   BMI 25.09 kg/m²     General:  Alert, cooperative, no distress, appears stated age. Head:  Normocephalic, without obvious abnormality, atraumatic. Eyes:  Conjunctivae/corneas clear. PERRL, EOMs intact. Fundi benign   Ears:  Normal TMs and external ear canals both ears. Nose: Nares normal. Septum midline. Mucosa normal. No drainage or sinus tenderness. Throat: Lips, mucosa, and tongue normal. Teeth and gums normal.   Neck: Supple, symmetrical, trachea midline, no adenopathy, thyroid: no enlargement/tenderness/nodules, no carotid bruit and no JVD. Back:   Symmetric, no curvature. ROM normal. No CVA tenderness. Lungs:   Clear to auscultation bilaterally.    Chest wall:  No tenderness or deformity. Heart:  Regular rate and rhythm, S1, S2 normal, no murmur, click, rub or gallop. Abdomen:   Soft, non-tender. Bowel sounds normal. No masses,  No organomegaly. Genitalia:  deferred   Rectal:  deferred   Extremities: Extremities normal, atraumatic, no cyanosis or edema. Pulses: 2+ and symmetric all extremities. Skin: Skin color, texture, turgor normal. No rashes or lesions   Lymph nodes: Cervical, supraclavicular, and axillary nodes normal.   Neurologic: CNII-XII intact. Normal strength, sensation and reflexes throughout. LABS   PSA 1.3  HDL 66      Assessment/Plan:    1. Nocturia  Assess for hyperglycemia  - METABOLIC PANEL, BASIC; Future    2. Medicare annual wellness visit, initial  Reviewed preventive recommendations  - [address] [address] [employer]    3. Advance care planning  See ACP  - ADVANCE CARE PLANNING FIRST 30 MINS    4. Need for vaccination against Streptococcus pneumoniae    - ADMIN PNEUMOCOCCAL VACCINE  - PNEUMOCOCCAL POLYSACCHARIDE VACCINE, 23-VALENT, ADULT OR IMMUNOSUPPRESSED PT DOSE,    5. Need for shingles vaccine    - ZOSTER VACC RECOMBINANT ADJUVANTED    6. Abnormal TSH  Assess for change in metabolism  - TSH 3RD GENERATION; Future    7. Pure hypercholesterolemia    - TSH 3RD GENERATION;  Future      Lab review: labs reviewed, I note that lipids LDL result does not yet meet goal, HDL normal, triglycerides normal, PSA normal

## 2020-06-22 NOTE — PROGRESS NOTES
Cristina Campbell presents today for   Chief Complaint   Patient presents with    Complete Physical       Is someone accompanying this pt? no    Is the patient using any DME equipment during OV? no    Depression Screening:  3 most recent PHQ Screens 6/4/2019   Little interest or pleasure in doing things Not at all   Feeling down, depressed, irritable, or hopeless Not at all   Total Score PHQ 2 0       Learning Assessment:  Learning Assessment 6/4/2019   PRIMARY LEARNER Patient   HIGHEST LEVEL OF EDUCATION - PRIMARY LEARNER  > 4 YEARS OF COLLEGE   BARRIERS PRIMARY LEARNER NONE   CO-LEARNER CAREGIVER No   PRIMARY LANGUAGE ENGLISH   LEARNER PREFERENCE PRIMARY READING   ANSWERED BY patient   RELATIONSHIP SELF       Abuse Screening:  Abuse Screening Questionnaire 6/4/2019   Do you ever feel afraid of your partner? N   Are you in a relationship with someone who physically or mentally threatens you? N   Is it safe for you to go home? Y       Fall Risk  Fall Risk Assessment, last 12 mths 6/4/2019   Able to walk? Yes   Fall in past 12 months? No       Health Maintenance reviewed and discussed and ordered per Provider. Health Maintenance Due   Topic Date Due    Shingrix Vaccine Age 49> (1 of 2) 03/31/2004    GLAUCOMA SCREENING Q2Y  03/31/2019    Pneumococcal 65+ years (2 of 2 - PPSV23) 06/04/2020    Medicare Yearly Exam  06/04/2020   . Coordination of Care:  1. Have you been to the ER, urgent care clinic since your last visit? Hospitalized since your last visit? no    2. Have you seen or consulted any other health care providers outside of the [address] [place] [address] since your last visit? Include any pap smears or colon screening.  no      Last  Checked na  Last UDS Checked na  Last Pain contract signed: na    complete physical examination

## 2020-06-22 NOTE — ACP (ADVANCE CARE PLANNING)
Advance Care Planning (ACP) Provider Note - Comprehensive      Date of ACP Conversation: 06/22/2020  Persons included in Conversation:  patient  Length of ACP Conversation in minutes:  16 minutes      Authorized Decision Maker (if patient is incapable of making informed decisions): This person is:  his wife                                                          General ACP for ALL Patients with Decision Making Capacity:   Importance of advance care planning, including choosing a healthcare agent to communicate patient's healthcare decisions if patient lost the ability to make decisions, such as after a sudden illness or accident  Understanding of the healthcare agent role was assessed and information provided  Exploration of values, goals, and preferences if recovery is not expected, even with continued medical treatment in the event of: Imminent death  Severe, permanent brain injury  \"In these circumstances, what matters most to you? \"  Care focused more on comfort or quality of life. \"What, if any, treatments would you want to avoid? \" none  Opportunity offered to explore how cultural, Hinduism, spiritual, or personal beliefs would affect decisions for future care      Review of Existing Advance Directive:  What information were you given about medical decisions to consider before completing your advance directive? conversation starter     For Serious or Chronic Illness:  Understanding of medical condition       Interventions Provided:  Recommended completion of Advance Directive form after review of ACP materials and conversation with prospective healthcare agent

## 2020-06-26 ENCOUNTER — HOSPITAL ENCOUNTER (OUTPATIENT)
Dept: LAB | Age: 65
Discharge: HOME | End: 2020-06-26
Payer: MEDICARE

## 2020-06-26 DIAGNOSIS — R79.89 ABNORMAL TSH: ICD-10-CM

## 2020-06-26 DIAGNOSIS — E78.00 PURE HYPERCHOLESTEROLEMIA: ICD-10-CM

## 2020-06-26 DIAGNOSIS — R35.1 NOCTURIA: ICD-10-CM

## 2020-06-26 LAB
ANION GAP SERPL CALC-SCNC: 7 MMOL/L (ref 3–18)
BUN SERPL-MCNC: 24 MG/DL (ref 7–18)
BUN/CREAT SERPL: 16 (ref 12–20)
CALCIUM SERPL-MCNC: 9.5 MG/DL (ref 8.5–10.1)
CHLORIDE SERPL-SCNC: 106 MMOL/L (ref 100–111)
CO2 SERPL-SCNC: 27 MMOL/L (ref 21–32)
CREAT SERPL-MCNC: 1.5 MG/DL (ref 0.6–1.3)
GLUCOSE SERPL-MCNC: 82 MG/DL (ref 74–99)
POTASSIUM SERPL-SCNC: 5.3 MMOL/L (ref 3.5–5.5)
SODIUM SERPL-SCNC: 140 MMOL/L (ref 136–145)
TSH SERPL DL<=0.05 MIU/L-ACNC: 3.97 UIU/ML (ref 0.36–3.74)

## 2020-06-26 PROCEDURE — 36415 COLL VENOUS BLD VENIPUNCTURE: CPT

## 2020-06-26 PROCEDURE — 80048 BASIC METABOLIC PNL TOTAL CA: CPT

## 2020-06-26 PROCEDURE — 84443 ASSAY THYROID STIM HORMONE: CPT

## 2020-08-18 ENCOUNTER — E-VISIT (OUTPATIENT)
Dept: FAMILY MEDICINE CLINIC | Age: 65
End: 2020-08-18

## 2020-08-18 DIAGNOSIS — N18.30 CHRONIC RENAL INSUFFICIENCY, STAGE 3 (MODERATE) (HCC): Primary | ICD-10-CM

## 2020-08-18 NOTE — TELEPHONE ENCOUNTER
Ghulam Quinn (1955) initiated an asynchronous digital communication through [address] [place] [address]. HPI: per patient questionnaire     Exam: not applicable    Diagnoses and all orders for this visit:  Diagnoses and all orders for this visit:    1. Chronic renal insufficiency, stage 3 (moderate) (HCC)  -     METABOLIC PANEL, BASIC; Future          Time: EV1 - 5-10 minutes were spent on the digital evaluation and management of this patient.       Jm Salas MD

## 2020-08-20 ENCOUNTER — HOSPITAL ENCOUNTER (OUTPATIENT)
Dept: LAB | Age: 65
Discharge: HOME | End: 2020-08-20
Payer: MEDICARE

## 2020-08-20 DIAGNOSIS — N18.30 CHRONIC RENAL INSUFFICIENCY, STAGE 3 (MODERATE) (HCC): ICD-10-CM

## 2020-08-20 LAB
ANION GAP SERPL CALC-SCNC: 4 MMOL/L (ref 3–18)
BUN SERPL-MCNC: 16 MG/DL (ref 7–18)
BUN/CREAT SERPL: 11 (ref 12–20)
CALCIUM SERPL-MCNC: 9.2 MG/DL (ref 8.5–10.1)
CHLORIDE SERPL-SCNC: 106 MMOL/L (ref 100–111)
CO2 SERPL-SCNC: 31 MMOL/L (ref 21–32)
CREAT SERPL-MCNC: 1.5 MG/DL (ref 0.6–1.3)
GLUCOSE SERPL-MCNC: 77 MG/DL (ref 74–99)
POTASSIUM SERPL-SCNC: 4.7 MMOL/L (ref 3.5–5.5)
SODIUM SERPL-SCNC: 141 MMOL/L (ref 136–145)

## 2020-08-20 PROCEDURE — 80048 BASIC METABOLIC PNL TOTAL CA: CPT

## 2020-08-20 PROCEDURE — 36415 COLL VENOUS BLD VENIPUNCTURE: CPT

## 2021-02-16 DIAGNOSIS — E78.00 PURE HYPERCHOLESTEROLEMIA: ICD-10-CM

## 2021-02-17 RX ORDER — EZETIMIBE 10 MG/1
TABLET ORAL
Qty: 90 TAB | Refills: 3 | Status: SHIPPED | OUTPATIENT
Start: 2021-02-17 | End: 2022-08-31

## 2021-07-01 ENCOUNTER — HOSPITAL ENCOUNTER (OUTPATIENT)
Dept: LAB | Age: 66
Discharge: HOME | End: 2021-07-01
Payer: MEDICARE

## 2021-07-01 ENCOUNTER — OFFICE VISIT (OUTPATIENT)
Dept: FAMILY MEDICINE CLINIC | Age: 66
End: 2021-07-01
Payer: MEDICARE

## 2021-07-01 VITALS
WEIGHT: 182 LBS | BODY MASS INDEX: 24.65 KG/M2 | OXYGEN SATURATION: 99 % | DIASTOLIC BLOOD PRESSURE: 87 MMHG | RESPIRATION RATE: 16 BRPM | HEART RATE: 62 BPM | TEMPERATURE: 97.7 F | SYSTOLIC BLOOD PRESSURE: 136 MMHG | HEIGHT: 72 IN

## 2021-07-01 DIAGNOSIS — E78.00 PURE HYPERCHOLESTEROLEMIA: ICD-10-CM

## 2021-07-01 DIAGNOSIS — Z12.5 PROSTATE CANCER SCREENING: ICD-10-CM

## 2021-07-01 DIAGNOSIS — Z00.00 MEDICARE ANNUAL WELLNESS VISIT, SUBSEQUENT: Primary | ICD-10-CM

## 2021-07-01 DIAGNOSIS — M51.36 DEGENERATIVE DISC DISEASE, LUMBAR: ICD-10-CM

## 2021-07-01 DIAGNOSIS — R35.1 NOCTURIA: ICD-10-CM

## 2021-07-01 DIAGNOSIS — Z23 NEED FOR SHINGLES VACCINE: ICD-10-CM

## 2021-07-01 DIAGNOSIS — Z00.00 MEDICARE ANNUAL WELLNESS VISIT, SUBSEQUENT: ICD-10-CM

## 2021-07-01 DIAGNOSIS — Z71.89 ADVANCE CARE PLANNING: ICD-10-CM

## 2021-07-01 LAB
ALBUMIN SERPL-MCNC: 4.6 G/DL (ref 3.4–5)
ALBUMIN/GLOB SERPL: 1.5 {RATIO} (ref 0.8–1.7)
ALP SERPL-CCNC: 78 U/L (ref 45–117)
ALT SERPL-CCNC: 47 U/L (ref 16–61)
ANION GAP SERPL CALC-SCNC: 4 MMOL/L (ref 3–18)
AST SERPL-CCNC: 30 U/L (ref 10–38)
BILIRUB SERPL-MCNC: 0.7 MG/DL (ref 0.2–1)
BUN SERPL-MCNC: 19 MG/DL (ref 7–18)
BUN/CREAT SERPL: 14 (ref 12–20)
CALCIUM SERPL-MCNC: 9.7 MG/DL (ref 8.5–10.1)
CHLORIDE SERPL-SCNC: 106 MMOL/L (ref 100–111)
CHOLEST SERPL-MCNC: 249 MG/DL
CO2 SERPL-SCNC: 30 MMOL/L (ref 21–32)
CREAT SERPL-MCNC: 1.33 MG/DL (ref 0.6–1.3)
GLOBULIN SER CALC-MCNC: 3.1 G/DL (ref 2–4)
GLUCOSE SERPL-MCNC: 95 MG/DL (ref 74–99)
HDLC SERPL-MCNC: 80 MG/DL (ref 40–60)
HDLC SERPL: 3.1 {RATIO} (ref 0–5)
LDLC SERPL CALC-MCNC: 152.4 MG/DL (ref 0–100)
LIPID PROFILE,FLP: ABNORMAL
POTASSIUM SERPL-SCNC: 4.8 MMOL/L (ref 3.5–5.5)
PROT SERPL-MCNC: 7.7 G/DL (ref 6.4–8.2)
PSA SERPL-MCNC: 1.7 NG/ML (ref 0–4)
SODIUM SERPL-SCNC: 140 MMOL/L (ref 136–145)
TRIGL SERPL-MCNC: 83 MG/DL (ref ?–150)
TSH SERPL DL<=0.05 MIU/L-ACNC: 3.25 UIU/ML (ref 0.36–3.74)
VLDLC SERPL CALC-MCNC: 16.6 MG/DL

## 2021-07-01 PROCEDURE — 36415 COLL VENOUS BLD VENIPUNCTURE: CPT

## 2021-07-01 PROCEDURE — G8536 NO DOC ELDER MAL SCRN: HCPCS | Performed by: FAMILY MEDICINE

## 2021-07-01 PROCEDURE — 80053 COMPREHEN METABOLIC PANEL: CPT

## 2021-07-01 PROCEDURE — G0439 PPPS, SUBSEQ VISIT: HCPCS | Performed by: FAMILY MEDICINE

## 2021-07-01 PROCEDURE — 80061 LIPID PANEL: CPT

## 2021-07-01 PROCEDURE — 84443 ASSAY THYROID STIM HORMONE: CPT

## 2021-07-01 PROCEDURE — G8420 CALC BMI NORM PARAMETERS: HCPCS | Performed by: FAMILY MEDICINE

## 2021-07-01 PROCEDURE — 3017F COLORECTAL CA SCREEN DOC REV: CPT | Performed by: FAMILY MEDICINE

## 2021-07-01 PROCEDURE — G8427 DOCREV CUR MEDS BY ELIG CLIN: HCPCS | Performed by: FAMILY MEDICINE

## 2021-07-01 PROCEDURE — 99214 OFFICE O/P EST MOD 30 MIN: CPT | Performed by: FAMILY MEDICINE

## 2021-07-01 PROCEDURE — 84153 ASSAY OF PSA TOTAL: CPT

## 2021-07-01 PROCEDURE — 1101F PT FALLS ASSESS-DOCD LE1/YR: CPT | Performed by: FAMILY MEDICINE

## 2021-07-01 PROCEDURE — G8510 SCR DEP NEG, NO PLAN REQD: HCPCS | Performed by: FAMILY MEDICINE

## 2021-07-01 NOTE — PROGRESS NOTES
(AWV) The Medicare Annual Wellness Exam PROGRESS NOTE    This is a Suzette Exam (AWV)     Rosanne Giron is a 78 y.o.  male and presents for an annual wellness exam     ROS   Constitutional: Negative for chills and fever. HENT: Negative for congestion, ear pain and sore throat.  he had allergy symptoms 2 months ago and used benadryl with good results. Eyes: Negative for discharge and redness. Respiratory: Negative for cough and shortness of breath.    Cardiovascular: Negative for chest pain, palpitations and orthopnea. Gastrointestinal: Negative for abdominal pain, nausea and vomiting. Genitourinary: Negative for frequency and urgency. Skin: Negative for rash. Neurological: Negative for weakness and headaches. msk : positive for shoulder pain but this has improved with less swimming  Endo/Heme/Allergies: Does not bruise/bleed easily.     All other systems reviewed and are negative. History     Past Medical History:   Diagnosis Date    BPH associated with nocturia     Elevated PSA     Hypercholesterolemia       Past Surgical History:   Procedure Laterality Date    HX ORTHOPAEDIC  2013    back surgery     Current Outpatient Medications   Medication Sig Dispense Refill    ezetimibe (ZETIA) 10 mg tablet TAKE 1 TABLET BY MOUTH DAILY 90 Tab 3    cartilage-collagen-bor-hyalur 40-10-5-3.3 mg tab Take  by mouth.  KRILL OIL PO Take  by mouth.  coenzyme q10 (CO Q-10) 300 mg cap Take  by mouth.  fexofenadine (ALLEGRA) 180 mg tablet Take  by mouth.  multivitamin (ONE A DAY) tablet Take 1 Tab by mouth daily.  fluticasone (FLONASE) 50 mcg/actuation nasal spray 2 Sprays by Both Nostrils route daily. 1 Bottle 0    cholecalciferol, VITAMIN D3, (VITAMIN D3) 5,000 unit tab tablet Take  by mouth daily. Allergies   Allergen Reactions    Hayfebrol [Chlorpheniramine-Pseudoephed] Runny Nose     NOT ALLERGIC TO THIS PER PATIENT.     Pravastatin Myalgia     NOT ALLERGIC TO THIS PER PATIENT. Family History   Problem Relation Age of Onset    Cancer Father         Liver    Heart Disease Father     Heart Attack Father     Hypertension Sister     Hypertension Brother     Arthritis-rheumatoid Mother      Social History     Tobacco Use    Smoking status: Never Smoker    Smokeless tobacco: Never Used   Substance Use Topics    Alcohol use: Yes     Comment: 2 drinks per day     Patient Active Problem List   Diagnosis Code    Hypercholesteremia E78.00    Advanced care planning/counseling discussion Z71.89    Degenerative disc disease, lumbar M51.36    Negative depression screening Z13.31       Health Maintenance History  Immunizations reviewed, dtap up to date , pneumovax up to date, flu up to date, zoster due  Colonoscopy: up to date,   Eye exam: up to date  covid vaccine    Depression Risk Factor Screening:      Patient Health Questionnaire (PHQ-2)   Over the last 2 weeks, how often have you been bothered by any of the following problems? · Little interest or pleasure in doing things? · Not at all. [0]  · Feeling down, depressed, or hopeless? · Not at all. [0]    Total Score: 0/6  PHQ-2 Assessment Scoring:   A score of 2 or more requires further screening with the PHQ-9    Alcohol Risk Factor Screening:     Men: On any occasion during the past 3 months, have you had more than 4 drinks containing alcohol? no  Do you average more than 14 drinks per week? no    Functional Ability and Level of Safety:     Hearing Loss    Hearing is good. Activities of Daily Living   Self-care. Requires assistance with: no ADLs    Fall Risk   No fall risk factors    Abuse Screen   Patient is not abused    Examination   Physical Examination  Vitals:    07/01/21 0917   BP: 136/87   Pulse: 62   Resp: 16   Temp: 97.7 °F (36.5 °C)   TempSrc: Temporal   SpO2: 99%   Weight: 182 lb (82.6 kg)   Height: 6' (1.829 m)   PainSc:   0 - No pain      Body mass index is 24.68 kg/m². Evaluation of Cognitive Function:  Mood/affect:good mood with appropriate affect  Appearance: well kempt  Family member/caregiver input: n/a    alert, well appearing, and in no distress, oriented to person, place, and time and normal appearing weight    Patient Care Team:  Addy Chin MD as PCP - General (Family Medicine)  Addy Chin MD as PCP - [address] [place] [address] Dr Morrell Provider    End-of-life planning  Advanced Directive in the case than an injury or illness causes the patient to be unable to make health care decisions    Health Care Directive or Living Will: yes    Advice/Referrals/Counselling/Plan:   Education and counseling provided:  End-of-Life planning (with patient's consent)  Diabetes screening test  covid vaccine  weight loss, physical activity, smoking cessation, fall prevention, and nutrition    ICD-10-CM ICD-9-CM    1. Medicare annual wellness visit, subsequent  Z00.00 V70.0 TSH 3RD GENERATION      [place] DEPRESSION SCREEN ANNUAL   2. Advance care planning  Z71.89 V65.49 ADVANCE CARE PLANNING FIRST 30 MINS   3. Pure hypercholesterolemia  E78.00 272.0 LIPID PANEL   4. Degenerative disc disease, lumbar  M51.36 722.52    5. Prostate cancer screening  Z12.5 V76.44 PSA, DIAGNOSTIC (PROSTATE SPECIFIC AG)   6. Nocturia  R35.1 788.43 PSA, DIAGNOSTIC (PROSTATE SPECIFIC AG)      METABOLIC PANEL, COMPREHENSIVE   . Brief written plan, checklist    I have discussed the diagnosis with the patient and the intended plan as seen in the above orders. The patient has received an after-visit summary and questions were answered concerning future plans. I have discussed medication side effects and warnings with the patient as well. I have reviewed the plan of care with the patient, accepted their input and they are in agreement with the treatment goals.    ____________________________________________________________    Problem Assessment    for treatment of   Chief Complaint   Patient presents with   [facility] Wellness Visit SUBJECTIVE    Well Adult Physical   Patient here for a comprehensive physical exam.The patient reports problems - hypercholesterolemia, ddd, left shoulder pain  Do you take any herbs or supplements that were not prescribed by a doctor? no Are you taking calcium supplements? no Are you taking aspirin daily? not applicable    Cardiovascular Review:  The patient has hyperlipidemia. Diet and Lifestyle: generally follows a low fat low cholesterol diet, generally follows a low sodium diet, does not rigorously follow a diabetic diet, exercises sporadically, nonsmoker  Home BP Monitoring: is not measured at home. Pertinent ROS: taking medications as instructed, no medication side effects noted, no TIA's, no chest pain on exertion, no dyspnea on exertion, no swelling of ankles. Osteoarthritis and Chronic Pain:  Patient has osteoarthritis, primarily affecting the back and left shoulder   Symptoms onset: several years ago. Rheumatological ROS: ongoing significant pain in back and shoulders which is stable and controlled by PRN meds. Response to treatment plan: waxing and waning. Visit Vitals  /87 (BP 1 Location: Right arm, BP Patient Position: Sitting, BP Cuff Size: Adult)   Pulse 62   Temp 97.7 °F (36.5 °C) (Temporal)   Resp 16   Ht 6' (1.829 m)   Wt 182 lb (82.6 kg)   SpO2 99%   BMI 24.68 kg/m²     General:  Alert, cooperative, no distress, appears stated age. Head:  Normocephalic, without obvious abnormality, atraumatic. Eyes:  Conjunctivae/corneas clear. PERRL, EOMs intact. Ears:  Normal TMs and external ear canals both ears. Nose: Nares normal. Septum midline. Mucosa normal. No drainage or sinus tenderness. Throat: Lips, mucosa, and tongue normal. Teeth and gums normal.   Neck: Supple, symmetrical, trachea midline, no adenopathy, thyroid: no enlargement/tenderness/nodules, no carotid bruit and no JVD. Back:   Symmetric, no curvature. ROM normal. No CVA tenderness.    Lungs:   Clear to auscultation bilaterally. Chest wall:  No tenderness or deformity. Heart:  Regular rate and rhythm, S1, S2 normal, no murmur, click, rub or gallop. Abdomen:   Soft, non-tender. Bowel sounds normal. No masses,  No organomegaly. Genitalia:  deferred   Rectal:  deferred   Extremities: Extremities normal, atraumatic, no cyanosis or edema. Pulses: 2+ and symmetric all extremities. Skin: Skin color, texture, turgor normal. No rashes or lesions   Lymph nodes: Cervical, supraclavicular, and axillary nodes normal.   Neurologic: CNII-XII intact. Normal strength, sensation and reflexes throughout. LABS     TESTS    Assessment/Plan:    1. Medicare annual wellness visit, subsequent  Reviewed preventive recommendations  - TSH 3RD GENERATION; Future  - [place] DEPRESSION SCREEN ANNUAL    2. Advance care planning  See ACP  - ADVANCE CARE PLANNING FIRST 30 MINS    3. Pure hypercholesterolemia  Assess for efficacy of treatment  - LIPID PANEL; Future    4. Degenerative disc disease, lumbar  Core strengthening exercises    5. Prostate cancer screening    - PSA, DIAGNOSTIC (PROSTATE SPECIFIC AG); Future    6. Nocturia    - PSA, DIAGNOSTIC (PROSTATE SPECIFIC AG); Future  - METABOLIC PANEL, COMPREHENSIVE;  Future      Lab review: orders written for new lab studies as appropriate; see orders

## 2021-07-01 NOTE — PROGRESS NOTES
Vera Rose presents today for   Chief Complaint   Patient presents with    Annual Wellness Visit       Is someone accompanying this pt? no    Is the patient using any DME equipment during OV? no    Depression Screening:  3 most recent PHQ Screens 7/1/2021   Little interest or pleasure in doing things Not at all   Feeling down, depressed, irritable, or hopeless Not at all   Total Score PHQ 2 0       Learning Assessment:  Learning Assessment 6/4/2019   PRIMARY LEARNER Patient   HIGHEST LEVEL OF EDUCATION - PRIMARY LEARNER  > 4 YEARS OF COLLEGE   BARRIERS PRIMARY LEARNER NONE   CO-LEARNER CAREGIVER No   PRIMARY LANGUAGE ENGLISH   LEARNER PREFERENCE PRIMARY READING   ANSWERED BY patient   RELATIONSHIP SELF       Abuse Screening:  Abuse Screening Questionnaire 6/4/2019   Do you ever feel afraid of your partner? N   Are you in a relationship with someone who physically or mentally threatens you? N   Is it safe for you to go home? Y       Fall Risk  Fall Risk Assessment, last 12 mths 7/1/2021   Able to walk? Yes   Fall in past 12 months? 0   Do you feel unsteady? 0   Are you worried about falling 0       Health Maintenance reviewed and discussed and ordered per Provider. Health Maintenance Due   Topic Date Due    COVID-19 Vaccine (1) Never done    Shingrix Vaccine Age 50> (2 of 2) 08/17/2020    Medicare Yearly Exam  06/23/2021   . Coordination of Care:  1. Have you been to the ER, urgent care clinic since your last visit? Hospitalized since your last visit? no    2. Have you seen or consulted any other health care providers outside of the [address] [place] [address] since your last visit? Include any pap smears or colon screening.  no      Last  Checked na  Last UDS Checked na  Last Pain contract signed: na    Annual Medicare Wellness Exam

## 2021-07-26 ENCOUNTER — PATIENT MESSAGE (OUTPATIENT)
Dept: FAMILY MEDICINE CLINIC | Age: 66
End: 2021-07-26

## 2021-08-01 DIAGNOSIS — E78.00 PURE HYPERCHOLESTEROLEMIA: Primary | ICD-10-CM

## 2021-08-01 RX ORDER — ATORVASTATIN CALCIUM 40 MG/1
40 TABLET, FILM COATED ORAL DAILY
Qty: 90 TABLET | Refills: 3 | Status: SHIPPED | OUTPATIENT
Start: 2021-08-01 | End: 2021-10-26

## 2021-10-25 DIAGNOSIS — E78.00 PURE HYPERCHOLESTEROLEMIA: ICD-10-CM

## 2021-10-26 RX ORDER — ATORVASTATIN CALCIUM 40 MG/1
40 TABLET, FILM COATED ORAL DAILY
Qty: 90 TABLET | Refills: 3 | Status: SHIPPED | OUTPATIENT
Start: 2021-10-26

## 2021-11-14 LAB — SARS-COV-2, NAA: NEGATIVE

## 2022-03-19 PROBLEM — E78.00 HYPERCHOLESTEREMIA: Status: ACTIVE | Noted: 2017-10-02

## 2022-03-19 PROBLEM — Z71.89 ADVANCED CARE PLANNING/COUNSELING DISCUSSION: Status: ACTIVE | Noted: 2018-03-22

## 2022-03-19 PROBLEM — M51.36 DEGENERATIVE DISC DISEASE, LUMBAR: Status: ACTIVE | Noted: 2018-03-22

## 2022-03-19 PROBLEM — Z13.31 NEGATIVE DEPRESSION SCREENING: Status: ACTIVE | Noted: 2018-03-22

## 2022-06-02 DIAGNOSIS — H90.0 CONDUCTIVE HEARING LOSS, BILATERAL: Primary | ICD-10-CM

## 2022-08-03 ENCOUNTER — HOSPITAL ENCOUNTER (OUTPATIENT)
Dept: PHYSICAL THERAPY | Age: 67
Discharge: HOME | End: 2022-08-03
Payer: MEDICARE

## 2022-08-03 PROCEDURE — 97161 PT EVAL LOW COMPLEX 20 MIN: CPT

## 2022-08-03 PROCEDURE — 97110 THERAPEUTIC EXERCISES: CPT

## 2022-08-03 PROCEDURE — 97035 APP MDLTY 1+ULTRASOUND EA 15: CPT

## 2022-08-03 NOTE — PROGRESS NOTES
PT SHOULDER EVAL AND TREATMENT      Patient Name: Yao Awad  Date:8/3/2022  : 1955  [x]  Patient  Verified  Payor: [place] MEDICARE / Plan: [place] MEDICARE PART A & B / Product Type: Medicare /    In time:1015  Out time:1110  Total Treatment Time (min): 55  Total Timed Codes (min): 23  1:1 Treatment Time (MC/BC only): 45   Visit #: 1 of 8-10    Treatment Area: Right shoulder pain [M25.511]    SUBJECTIVE  Pain Level (0-10 scale):  (C):1  (B):1  (W): 7   Any medication changes, allergies to medications, diagnosis change, or new procedure performed: see summary sheet for update  Subjective functional status/changes:  CHIEF COMPLAINT:  Patient reports co R shoulder pain after fall onto R arm while coming down from ladder on 22. Patient went to ER sec to L arm laceration and got Xrays of R shoulder report No acute pathology appreciated in the right shoulder. Pain ranges form 1 to 7/10 and past managed with Z pack (declined injection at the time, but is scheduled for injection on ), IBUprofen at night. Patient reports doing band work for shoulder strengthening sec to hx of shoulder discomfort from swimming. Patient is R hand dominant. PMH significant for 3 LS surgeries over 18 years with last lami in .    DEFICITS: sleeping on R side, nightly awakening,  avoiding swimming- especially back stroke and butterfly , overhead lifting, discomfort with long walks (45 min), babysitting grandkids - floor transfers, throwing kids in the pool, other exercises: planking, pushup   OBJECTIVE:   Posture: mild fwd shoulder     ROM:  Fully functional all direction with co painful arc with scaption  Painful at end range ER   FIR L  T5, R T7     Strength:            Flexion : flexion and scaption 5/5 with pain on resistance for ABD    ER 4/5 with pain on resistance     Palpation - denies TTP of GH and periscapular     Other Tests / Comments:   HK - negative   Crossover impingement - + for anterior shoulder pain     Patient Education/ Therapeutic Exercise : [x] Discussed POT including PT expectation, established HEP with pictures and instruction, RTC impingement pathology, avoiding pain exacerbating movements  (minutes) : 15    Modalities: Rationale decrease inflammation of the RTC   8 min : pulsed 50% to the GT at .8 w/cm 2, 1 mghz    Pain Level (0-10 scale) post treatment: 1    ASSESSMENT  [x]  See Plan of Care    PLAN  [x]  Upgrade activities as tolerated  [x] Other:_POC   2x 8-10    Barak Swain, PT 8/3/2022  Justification for Eval Code Complexity:  Patient History : fall on 5/14  Examination see exam   Clinical Presentation: stable   Clinical Decision Making : FOTO : pending  /100

## 2022-08-03 NOTE — PROGRESS NOTES
[facility] AT [address]  Phone: [phone] Fax: [phone] / [address] [address] PHYSICAL THERAPY SERVICES  Patient Name: Nahun Reeves : 1955   Medical   Diagnosis: Right shoulder pain [M25.511] Treatment Diagnosis: R shoulder impingement / bursitis    Onset Date: 2022     Referral Source: Karyna Vasques MD Start of Care [facility]): 8/3/2022   Prior Hospitalization: See medical history Provider #: [id number]   Prior Level of Function: Functional I , swimmer , plays with grandchildren    Comorbidities: LS lami in 2013   Medications: Verified on Patient Summary List   The Plan of Care and following information is based on the information from the initial evaluation.   ========================================================================  Assessment / key information:  Pt is a 75y.o. year old male who presents with co R shoulder pain after fall onto R arm while coming down from ladder on 22. Patient went to ER sec to L arm laceration and got Xrays of R shoulder report No acute pathology appreciated in the right shoulder. Pain ranges form 1 to 7/10 and past managed with Z pack (declined injection at the time, but is scheduled for injection on ), IBUprofen at night. Patient reports doing band work for shoulder strengthening sec to hx of shoulder discomfort from swimming. Patient is R hand dominant. PMH significant for 3 LS surgeries over 18 years with last lami in . Functional DEFICITS: sleeping on R side, nightly awakening,  avoiding swimming- especially back stroke and butterfly , overhead lifting, discomfort with long walks (45 min), babysitting grandkids - floor transfers, throwing kids in the pool, other exercises: planking, pushup .  Positive objective finding as follows:  Posture: mild fwd shoulder   ROM:  Fully functional all direction with co painful arc with scaption  Painful at end range ER   FIR L  T5, R T7   Strength:            Flexion : flexion and scaption 5/5 with pain on resistance for ABD    ER 4/5 with pain on resistance   Other Tests / Comments:   Crossover impingement - + for anterior shoulder pain    Home exercise program initiated on initial evaluation to address these deficits. Pt would benefit from PT to address these deficits for increased functional mobility and QOL.  ========================================================================  Eval Complexity: History: MEDIUM  Complexity : 1-2 comorbidities / personal factors will impact the outcome/ POC Exam:HIGH Complexity : 4+ Standardized tests and measures addressing body structure, function, activity limitation and / or participation in recreation  Presentation: LOW Complexity : Stable, uncomplicated  Clinical Decision Making:LOW Complexity : FOTO score of 75-100Overall Complexity:LOW   Problem List: pain affecting function, decrease ROM, decrease strength, decrease activity tolerance, and decrease flexibility/ joint mobility   Treatment Plan may include any combination of the following: Therapeutic exercise, Therapeutic activities, Neuromuscular re-education, Physical agent/modality, Gait/balance training, Manual therapy, Aquatic therapy, Patient education, Self Care training, Functional mobility training, Home safety training, and Stair training  Patient / Family readiness to learn indicated by: asking questions, trying to perform skills and interest  Persons(s) to be included in education: patient (P)  Barriers to Learning/Limitations: None  Measures taken:    Patient Goal (s): \"Ease pain, get back to swimming, better sleep \"   Patient self reported health status: good  Rehabilitation Potential: good  Short Term Goals: To be accomplished in  1  weeks:  1. Pt will be independent and compliant with HEP  Status at last assessment :HEP est at initial eval and will be progress as needed.    Long Term Goals: To be accomplished in  8-10  treatments:  1. Patient will increase FOTO score to 74/100 for indications of increased functional mobility. Status at last assessment : 61/100   2. Patient will demo B symm FIR for improved joint mobility for all swim strokes   Status at last assessment : R T7  3. Patient will demo 4+/5 shoulder ER strength for improved GH stability when throwing grandkids   Status at last assessment :4  4. Patient will report decreased pain to 4/10 at worst for improved sleep quality   Status at last assessment :7  Frequency / Duration:   Medicare Patients: Patient would benefit from skilled PT 2-3 times per week for 24-36 sessions as needed in this certification period. Goals will be assigned and reassessed every 10 visits/ 30 days per Medicare guidelines   Patient / Caregiver education and instruction: self care, activity modification and exercises  Therapist Signature: Izabel Calderon PT Date: 9/6/9333   Certification Period: 8/3/2022-11/1/2022 Time: 7478I   ========================================================================  I certify that the above Physical Therapy Services are being furnished while the patient is under my care. I agree with the treatment plan and certify that this therapy is necessary. Physician Signature:        Date:       Time:                                         Jamie Renteria MD  Please sign and return to [facility] or you may fax the signed copy to [phone]. Thank you.

## 2022-08-05 ENCOUNTER — HOSPITAL ENCOUNTER (OUTPATIENT)
Dept: PHYSICAL THERAPY | Age: 67
Discharge: HOME | End: 2022-08-05
Payer: MEDICARE

## 2022-08-05 PROCEDURE — 97112 NEUROMUSCULAR REEDUCATION: CPT

## 2022-08-05 PROCEDURE — 97110 THERAPEUTIC EXERCISES: CPT

## 2022-08-05 NOTE — PROGRESS NOTES
PT DAILY TREATMENT NOTE     Patient Name: Miriam Alcantara  Date:2022  : 1955  [x]  Patient  Verified  Payor: Jorge L Bardales / Plan: [place] MEDICARE PART A & B / Product Type: Medicare /    In time: 11:00 am        Out time: 11:56 am  Total Treatment Time (min): 56  Total Timed Codes (min): 46  1:1 Treatment Time (min): 41   Visit #: 2 of 8-10    Treatment Area: Right shoulder pain [M25.511]    SUBJECTIVE  Pain Level (0-10 scale): 1-2  Any medication changes, allergies to medications, adverse drug reactions, diagnosis change, or new procedure performed?: [x] No    [] Yes (see summary sheet for update)  Subjective functional status/changes:   [] No changes reported  Patient notes HEP has been uncomfortable.     OBJECTIVE  Modality rationale: decrease pain to improve the patients ability to perform ADLs     Min Type Additional Details    [] Estim: []Att   []Unatt                  []IFC  []Premod                                            []NMES    []Other:  []w/ice   []w/heat  Position:  Location:   10 [x]  Ice     []  heat  []  Ice massage Position: seated  Location:(R) shoulder    []  Vasopneumatic Device  If using vaso (only need to measure limb vaso being performed on)      pre-treatment girth :       post-treatment girth :       measured at (landmark location)  Pressure: [] lo [] med [] hi   Temp: [] lo [] med [] hi   [x] Skin assessment post-treatment:  [x]intact    []redness- no adverse reaction                                                                                 []redness - adverse reaction:     33 min Therapeutic Exercise:  [x] See flow sheet: initiated per IE   Rationale: increase ROM, increase strength, and improve coordination to improve the patients ability to perform ADLs      13 min Neuromuscular Re-education:  [x]  See flow sheet: initiated per IE   Rationale: increase strength, improve coordination, and increase proprioception  to improve the patients ability to reaching with TE min Patient Education: [x] Review HEP - SL ER and SA punch with light weight, high reps     Other Objective/Functional Measures:   STG progressing. Pain Level (0-10 scale) post treatment: 0    ASSESSMENT/Changes in Function:   Good tolerance to treatment today with patient req 100% verbal/tactile cueing and demo for proper form/technique with all newly introduced therex. Increased cueing req for proper positioning and performance of established HEP, including mild shoulder ABD positioning during seated inferior glide to improve efficacy of stretch, distraction provided towards the elbow during seated distraction as opposed to joint compression, pressing through the elbow during inferior glide isometrics to reduce forearm/wrist compensation. Patient will continue to benefit from skilled PT services to modify and progress therapeutic interventions, address functional mobility deficits, address ROM deficits, address strength deficits, analyze and address soft tissue restrictions, analyze and cue movement patterns, analyze and modify body mechanics/ergonomics, and assess and modify postural abnormalities to attain remaining goals. [x]  See Plan of Care  []  See progress note/recertification  []  See Discharge Summary         Progress towards goals / Updated goals -:  Short Term Goals: To be accomplished in  1  weeks:  1. Pt will be independent and compliant with HEP  Status at last assessment: HEP est at initial eval and will be progress as needed  Current: goal progressing; pt required cueing for all established HEP interventions to improve performance (8/5/22)  Long Term Goals: To be accomplished in  8-10  treatments:  1. Patient will increase FOTO score to 74/100 for indications of increased functional mobility. Status at last assessment : 61/100  2. Patient will demo B symm FIR for improved joint mobility for all swim strokes   Status at last assessment : R T7  3.   Patient will demo 4+/5 shoulder ER strength for improved GH stability when throwing grandkids   Status at last assessment :4  4.  Patient will report decreased pain to 4/10 at worst for improved sleep quality   Status at last assessment :7  (Progress Note due by 9/3/22)    PLAN  [x]  Upgrade activities as tolerated     [x]  Continue plan of care  []  Update interventions per flow sheet       []  Discharge due to:_  [x]  Other: assess response to treatment     Alexandria Desir, PTA 8/5/2022

## 2022-08-09 ENCOUNTER — HOSPITAL ENCOUNTER (OUTPATIENT)
Dept: PHYSICAL THERAPY | Age: 67
Discharge: HOME | End: 2022-08-09
Payer: MEDICARE

## 2022-08-09 PROCEDURE — 97110 THERAPEUTIC EXERCISES: CPT

## 2022-08-09 PROCEDURE — 97140 MANUAL THERAPY 1/> REGIONS: CPT

## 2022-08-09 PROCEDURE — 97530 THERAPEUTIC ACTIVITIES: CPT

## 2022-08-09 NOTE — PROGRESS NOTES
PT DAILY TREATMENT NOTE     Patient Name: Gracy Hendricks  LJKO:[id number]  : 1955  [x]  Patient  Verified  Payor: Mark Wu / Plan: [place] MED[place] A & B / Product Type: Medicare /    In time:5:45  Out time:6:35  Total Treatment Time (min): 50  Visit #: 3 of 8-10    Medicare/BCBS Only   Total Timed Codes (min):  50 1:1 Treatment Time:  45       Treatment Area: Right shoulder pain [M25.511]    SUBJECTIVE  Pain Level (0-10 scale): 2  Any medication changes, allergies to medications, adverse drug reactions, diagnosis change, or new procedure performed?: [x] No    [] Yes (see summary sheet for update)  Subjective functional status/changes:   [] No changes reported  \"I am feeling a bit stiff today. \"    OBJECTIVE    20 min Therapeutic Exercise:  [x] See flow sheet :   Rationale: increase ROM and increase strength to improve the patients ability to perform ADLs with improved shoulder/elbow strength and mobility. 16 min Neuromuscular Re-education:  [x]  See flow sheet :   Rationale: increase ROM, increase strength, improve coordination, improve balance and increase proprioception  to improve the patients ability to perform overhead functional lifts with improved scapular stabilization and neutral cervical posture. 14 min Manual Therapy:  grade II/III posterior/inferior mobilizations at mid range abduction/end range flexion respoectively; GHJ distraction mobs, grade IV; S/L scapula mobs   The manual therapy interventions were performed at a separate and distinct time from the therapeutic activities interventions. Rationale: decrease pain, increase ROM, increase tissue extensibility, decrease trigger points and increase postural awareness to improve ease of ADLs in the home environment.           With   [] TE   [] TA   [] neuro   [] other: Patient Education: [x] Review HEP    [] Progressed/Changed HEP based on:   [] positioning   [] body mechanics   [] transfers   [] heat/ice application    [] other: Other Objective/Functional Measures: -Added manual therapy interventions  -Added sidelying abduction/flexion     Pain Level (0-10 scale) post treatment: 0    ASSESSMENT/Changes in Function: Patient responds positively to manual therapy and sleeper stretch interventions today, noting decreased shoulder stiffness and improved FIR mobility following today's session. Pt requires skilled verbal/visual cuing to maintain scapular retraction with sidelying flexion. Progress as tolerated at next session. Patient will continue to benefit from skilled PT services to modify and progress therapeutic interventions, address functional mobility deficits, address ROM deficits, address strength deficits, analyze and address soft tissue restrictions, analyze and cue movement patterns, analyze and modify body mechanics/ergonomics, assess and modify postural abnormalities, address imbalance/dizziness and instruct in home and community integration to attain remaining goals. []  See Plan of Care  []  See progress note/recertification  []  See Discharge Summary         Progress towards goals / Updated goals:  Short Term Goals: To be accomplished in  1  weeks:  1. Pt will be independent and compliant with HEP  Status at last assessment: HEP est at initial eval and will be progress as needed  Current: goal progressing; pt required cueing for all established HEP interventions to improve performance (8/5/22)  Long Term Goals: To be accomplished in  8-10  treatments:  1. Patient will increase FOTO score to 74/100 for indications of increased functional mobility. Status at last assessment : 61/100  Current:   2. Patient will demo B symm FIR for improved joint mobility for all swim strokes   Status at last assessment : R T7  Current: progressing, right shoulder FIR to T6 with decreased pain that at evaluation (8/9/22)  3. Patient will demo 4+/5 shoulder ER strength for improved GH stability when throwing grandkids.   Status at last assessment :4  4.  Patient will report decreased pain to 4/10 at worst for improved sleep quality   Status at last assessment :7  (Progress Note due by 9/3/22)    PLAN  [x]  Upgrade activities as tolerated     [x]  Continue plan of care  []  Update interventions per flow sheet       []  Discharge due to:_  []  Other:_      Laura Alvarez 8/9/2022  11:21 AM    Future Appointments   Date Time Provider Jose Luis Lucia   8/9/2022  5:45 PM Griselda Friend [facility]   8/12/2022  8:45 AM Griselda Friend [facility]   8/18/2022  5:45 PM Siomara Mijares, PT [facility]   8/19/2022  9:15 AM Yari Salcedo MD DMA BS AMB   8/22/2022  8:30 AM Siomara Mijares, PT [facility]   8/24/2022 10:00 AM Ana Mcintyre, PT [facility]   8/29/2022  7:45 AM Richard Brown., PT [facility]   8/31/2022  7:45 AM Jazmine Brumfield., PT [facility]

## 2022-08-12 ENCOUNTER — HOSPITAL ENCOUNTER (OUTPATIENT)
Dept: PHYSICAL THERAPY | Age: 67
Discharge: HOME | End: 2022-08-12
Payer: MEDICARE

## 2022-08-12 PROCEDURE — 97140 MANUAL THERAPY 1/> REGIONS: CPT

## 2022-08-12 PROCEDURE — 97112 NEUROMUSCULAR REEDUCATION: CPT

## 2022-08-12 PROCEDURE — 97110 THERAPEUTIC EXERCISES: CPT

## 2022-08-12 NOTE — PROGRESS NOTES
PT DAILY TREATMENT NOTE     Patient Name: Kat Quick  Date:2022  : 1955  [x]  Patient  Verified  Payor: [place] MEDICARE / Plan: [place] MEDICARE PART A & B / Product Type: Medicare /    In time:8:45  Out time:9:50  Total Treatment Time (min): 65  Visit #: 4 of 8-10    Medicare/BCBS Only   Total Timed Codes (min):  55 1:1 Treatment Time:  55       Treatment Area: Right shoulder pain [M25.511]    SUBJECTIVE  Pain Level (0-10 scale): 1-2  Any medication changes, allergies to medications, adverse drug reactions, diagnosis change, or new procedure performed?: [x] No    [] Yes (see summary sheet for update)  Subjective functional status/changes:   [] No changes reported  \"I felt pretty good after last session, definitely hitting the right spots. \"    OBJECTIVE    Modality rationale: decrease pain to improve the patients ability to relax and sleep following therapy session to improve restorative sleep patterns.     Min Type Additional Details    [x] Estim:  []Unatt       []IFC  []Premod                        []Other:  []w/ice   []w/heat  Position:   Location:     [] Estim: []Att    []TENS instruct  []NMES                    []Other:  []w/US   []w/ice   []w/heat  Position:  Location:    []  Traction: [] Cervical       []Lumbar                       [] Prone          []Supine                       []Intermittent   []Continuous Lbs:  [] before manual  [] after manual    []  Ultrasound: []Continuous   [] Pulsed                           []1MHz   []3MHz W/cm2:  Location:    []  Iontophoresis with dexamethasone         Location: [] Take home patch   [] In clinic   10 [x]  Ice     []  heat  []  Ice massage  []  Laser   []  Anodyne Position: seated  Location: left shoulder    []  Laser with stim  []  Other:  Position:  Location:    []  Vasopneumatic Device    []  Right     []  Left  Pre-treatment girth:  Post-treatment girth:  Measured at (location):  Pressure:       [] lo [] med [] hi   Temperature: [] lo [] med [] hi   [x] Skin assessment post-treatment:  [x]intact []redness- no adverse reaction    []redness - adverse reaction:       15 min Therapeutic Exercise:  [x] See flow sheet :   Rationale: increase ROM and increase strength to improve the patients ability to perform ADLs with improved shoulder/elbow strength and mobility. 24 min Neuromuscular Re-education:  [x]  See flow sheet :   Rationale: increase ROM, increase strength, improve coordination, improve balance and increase proprioception  to improve the patients ability to perform overhead functional lifts with improved scapular stabilization and neutral cervical posture. 11 min Manual Therapy:  grade II/III posterior/inferior mobilizations at mid range abduction/end range flexion respectively; grade II/III anterior mobs in prone; GHJ distraction mobs, grade IV   The manual therapy interventions were performed at a separate and distinct time from the therapeutic activities interventions. Rationale: decrease pain, increase ROM, increase tissue extensibility, decrease trigger points and increase postural awareness to improve ease of ADLs in the home environment. With   [x] TE   [x] TA   [] neuro   [] other: Patient Education: [x] Review HEP    [] Progressed/Changed HEP based on:   [] positioning   [] body mechanics   [] transfers   [] heat/ice application    [] other:      Other Objective/Functional Measures: -Introduced anterior mobilizations and prone Ws, Ys     Pain Level (0-10 scale) post treatment: 0    ASSESSMENT/Changes in Function: Patient continues to require skilled verbal/visual cues for correct technique with all interventions, especially for scapular protraction. He tolerates progression of resistance interventions today without increased pain. He notes continued difficulty with right shoulder horizontal abduction AROM.     Patient will continue to benefit from skilled PT services to modify and progress therapeutic interventions, address functional mobility deficits, address ROM deficits, address strength deficits, analyze and address soft tissue restrictions, analyze and cue movement patterns, analyze and modify body mechanics/ergonomics, assess and modify postural abnormalities, address imbalance/dizziness and instruct in home and community integration to attain remaining goals. []  See Plan of Care  []  See progress note/recertification  []  See Discharge Summary         Progress towards goals / Updated goals:  Short Term Goals: To be accomplished in  1  weeks:  1. Pt will be independent and compliant with HEP  Status at last assessment: HEP est at initial eval and will be progress as needed  Current: goal progressing; pt required cueing for all established HEP interventions to improve performance (8/5/22)  Long Term Goals: To be accomplished in  8-10  treatments:  1. Patient will increase FOTO score to 74/100 for indications of increased functional mobility. Status at last assessment : 61/100  Current:   2. Patient will demo B symm FIR for improved joint mobility for all swim strokes   Status at last assessment : R T7  Current: progressing, right shoulder FIR to T6 with decreased pain that at evaluation (8/9/22)  3. Patient will demo 4+/5 shoulder ER strength for improved GH stability when throwing grandkids.   Status at last assessment :4  4.  Patient will report decreased pain to 4/10 at worst for improved sleep quality   Status at last assessment :7  (Progress Note due by 9/3/22)       PLAN  [x]  Upgrade activities as tolerated     [x]  Continue plan of care  []  Update interventions per flow sheet       []  Discharge due to:_  []  Other:_      Irwin Ohm 8/12/2022  7:55 AM    Future Appointments   Date Time Provider Jose Luis Myers   8/12/2022  8:45 AM Amber Fields [facility]   8/18/2022  5:45 PM Erwin Fry, PT [facility]   8/19/2022  9:15 AM Prieto Ruby MD DMA BS AMB   8/22/2022  8:30 AM Erwin Fry, PT [facility]   8/24/2022 10:00 AM Nabila Spencer, PT [facility]   8/29/2022  7:45 AM Fabienne Hinds., PT [facility]   8/31/2022  7:45 AM Virginie Mcfarlane., PT [facility]

## 2022-08-15 ENCOUNTER — HOSPITAL ENCOUNTER (OUTPATIENT)
Dept: PHYSICAL THERAPY | Age: 67
Discharge: HOME | End: 2022-08-15
Payer: MEDICARE

## 2022-08-15 PROCEDURE — 97140 MANUAL THERAPY 1/> REGIONS: CPT

## 2022-08-15 PROCEDURE — 97112 NEUROMUSCULAR REEDUCATION: CPT

## 2022-08-15 PROCEDURE — 97110 THERAPEUTIC EXERCISES: CPT

## 2022-08-15 NOTE — PROGRESS NOTES
PT DAILY TREATMENT NOTE     Patient Name: Emile Matos  Date:8/15/2022  : 1955  [x]  Patient  Verified  Payor: [place] MEDICARE / Plan: [place] MEDICARE PART A & B / Product Type: Medicare /    In time: 2:45 am             Out time: 3:51 am  Total Treatment Time (min): 66  Visit #: 5 of 8-10    Medicare/BCBS Only   Total Timed Codes (min):  56 1:1 Treatment Time:  54       Treatment Area: Right shoulder pain [M25.511]    SUBJECTIVE  Pain Level (0-10 scale): 1  Any medication changes, allergies to medications, adverse drug reactions, diagnosis change, or new procedure performed?: [x] No    [] Yes (see summary sheet for update)  Subjective functional status/changes:   [] No changes reported  Patient notes only having pain with reaching in certain directions. OBJECTIVE    Modality rationale: decrease pain to improve the patients ability to relax and sleep following therapy session to improve restorative sleep patterns. Min Type Additional Details    [] Estim:  []Unatt       []IFC  []Premod                        []Other:  []w/ice   []w/heat  Position:   Location:    10 [x]  Ice  Position: supine with LEs elevated on wedge  Location: left shoulder   [x] Skin assessment post-treatment:  [x]intact []redness- no adverse reaction    []redness - adverse reaction:     19 min Therapeutic Exercise:  [x] See flow sheet:    Rationale: increase ROM and increase strength to improve the patients ability to perform ADLs with improved shoulder/elbow strength and mobility. 29 min Neuromuscular Re-education:  [x]  See flow sheet: added SA wall box   Rationale: increase ROM, increase strength, improve coordination, improve balance and increase proprioception  to improve the patients ability to perform overhead functional lifts with improved scapular stabilization and neutral cervical posture.     8 min Manual Therapy: grade II/III posterior/inferior mobilizations at mid range abduction/end range flexion respectively; grade II/III anterior mobs in prone; GHJ distraction mobs, grade IV; DTM to (R) pectoralis major   The manual therapy interventions were performed at a separate and distinct time from the therapeutic activities interventions. Rationale: decrease pain, increase ROM, increase tissue extensibility, decrease trigger points and increase postural awareness to improve ease of ADLs in the home environment. With   [x] TE   [x] TA   [] neuro   [] other: Patient Education: [x] Review HEP     Other Objective/Functional Measures:   Worst pain: 2-3/10 at night     Pain Level (0-10 scale) post treatment: 0    ASSESSMENT/Changes in Function:   Patient demos improved RTC strength as evidenced by tolerance to an increase in PREs during prone ER; notes no anterior shoulder pain during movement. Patient will continue to benefit from skilled PT services to modify and progress therapeutic interventions, address functional mobility deficits, address ROM deficits, address strength deficits, analyze and address soft tissue restrictions, analyze and cue movement patterns, analyze and modify body mechanics/ergonomics, assess and modify postural abnormalities, address imbalance/dizziness and instruct in home and community integration to attain remaining goals. []  See Plan of Care  []  See progress note/recertification  []  See Discharge Summary         Progress towards goals / Updated goals:  Short Term Goals: To be accomplished in  1  weeks:  1. Pt will be independent and compliant with HEP  Status at last assessment: HEP est at initial eval and will be progress as needed  Current: goal progressing; pt required cueing for all established HEP interventions to improve performance (8/5/22)  Long Term Goals: To be accomplished in  8-10  treatments:  1. Patient will increase FOTO score to 74/100 for indications of increased functional mobility. Status at last assessment: 61/100  Current:   2.   Patient will demo (B) symmetrical FIR for improved joint mobility for all swim strokes   Status at last assessment: (R) T7  Current: progressing, right shoulder FIR to T6 with decreased pain that at evaluation (8/9/22)  3. Patient will demo 4+/5 shoulder ER strength for improved GH stability when throwing grandkids. Status at last assessment: 4/5  4.   Patient will report decreased pain to 4/10 at worst for improved sleep quality   Status at last assessment: 7/10  Current: goal met; 2-3/10 worst pain recently (8/15/22)  (Progress Note due by 9/3/22)       PLAN  [x]  Upgrade activities as tolerated     [x]  Continue plan of care  []  Update interventions per flow sheet       []  Discharge due to:_  []  Other:_      Arlys Plants, PTA 8/15/2022    Future Appointments   Date Time Provider Jose Luis Myers   8/15/2022  2:45 PM Mikaela Rubalcava [facility]   8/18/2022  5:45 PM Angelica Giles, PT [facility]   8/19/2022  9:15 AM Davonte Ruiz MD DMA BS AMB   8/22/2022  8:30 AM Angelica Giles, PT [facility]   8/24/2022 10:00 AM Nina Melendez, PT [facility]   8/29/2022  7:45 AM Benjamin Hall., PT [facility]   8/31/2022  7:45 AM Dago Elizabeth., PT [facility]

## 2022-08-18 ENCOUNTER — HOSPITAL ENCOUNTER (OUTPATIENT)
Dept: PHYSICAL THERAPY | Age: 67
Discharge: HOME | End: 2022-08-18
Payer: MEDICARE

## 2022-08-18 PROCEDURE — 97140 MANUAL THERAPY 1/> REGIONS: CPT

## 2022-08-18 PROCEDURE — 97112 NEUROMUSCULAR REEDUCATION: CPT

## 2022-08-18 PROCEDURE — 97110 THERAPEUTIC EXERCISES: CPT

## 2022-08-18 NOTE — PROGRESS NOTES
PT DAILY TREATMENT NOTE     Patient Name: Jose M Jose  EJXI:  : 1955  [x]  Patient  Verified  Payor: VA MEDICARE / Plan: VA MEDICARE PART A & B / Product Type: Medicare /    In time: 5:45           Out time: 6:42  Total Treatment Time (min): 57 (5:56 to 6:42: 46 minutes)   Visit #: 6 of 8-10    Medicare/BCBS Only   Total Timed Codes (min):  46 1:1 Treatment Time:  46       Treatment Area: Right shoulder pain [M25.511]    SUBJECTIVE  Pain Level (0-10 scale): 1  Any medication changes, allergies to medications, adverse drug reactions, diagnosis change, or new procedure performed?: [x] No    [] Yes (see summary sheet for update)  Subjective functional status/changes:   [] No changes reported  Was feeling good, but then I got a sharp pain when I had to grab my car door suddenly. That pain was the exception to the rule, at a 3-4/10 . Slept better. Not lying on the R shoulder yet. But could lay on it before bed and it wasn't terrible. Walking long  - at [employer] yesterday morning, 2 mile walk, felt good. OBJECTIVE    Modality rationale: decrease pain to improve the patients ability to relax and sleep following therapy session to improve restorative sleep patterns.     Min Type Additional Details   PD [x]  Ice  Position: supine with LEs elevated on wedge  Location: left shoulder   [x] Skin assessment post-treatment:  [x]intact []redness- no adverse reaction    []redness - adverse reaction:     30 (billed 21) min Therapeutic Exercise:  [x] See flow sheet:     -standing crossover stretch at wall  -cable column - eliza- joint mobs  - prone ER at 90: progressed to stand with blue tband to improve overall need for core and scapular control.  -Eliza inferior joint glide  -Suitcase carry with 20# KB for inferior GHJ glide and also scapular stability and rotational stab for swimming     Rationale: increase ROM and increase strength to improve the patients ability to perform ADLs with improved shoulder/elbow strength and mobility. 17 min Neuromuscular Re-education:  [x]  See flow sheet:     -body blade: at 120 scaption; ER at 90/90 (anterior to posterior sway)  -SA wall slide with green tband to progress     Rationale: increase ROM, increase strength, improve coordination, improve balance and increase proprioception  to improve the patients ability to perform overhead functional lifts with improved scapular stabilization and neutral cervical posture. 10 min Manual Therapy: grade II/III posterior/inferior mobilizations at mid range abduction/end range flexion respectively; grade II/III anterior mobs in prone; Posterior mobs in crossover position; GHJ distraction mobs, grade IV; DTM to (R) pectoralis major with TrP release; The manual therapy interventions were performed at a separate and distinct time from the therapeutic activities interventions. Rationale: decrease pain, increase ROM, increase tissue extensibility, decrease trigger points and increase postural awareness to improve ease of ADLs in the home environment. With   [x] TE   [x] TA   [] neuro   [] other: Patient Education: [x] Review HEP  Scapular setting - push up plus  Rows with scapular retraction  -Progression with return to swimming  -Updated HEP to progress his current program as well as previous work he's been doing prior to injury and to progress to return to swimming. Other Objective/Functional Measures:     PNF D1 and D2 - pain free and full except end range D2 extension        Pain Level (0-10 scale) post treatment:0    ASSESSMENT/Changes in Function:   Pt with nearly full AROM and PROM at end ranges. Min c/o pain with end range PNF D2, ER at 90/90 (>100 deg) and with crossover stretch. Pt with improved awareness of scapular stability, OH mechanics. Good response to HEP with great compliance noted palomo with mobs.      Patient will continue to benefit from skilled PT services to modify and progress therapeutic interventions, address functional mobility deficits, address ROM deficits, address strength deficits, analyze and address soft tissue restrictions, analyze and cue movement patterns, analyze and modify body mechanics/ergonomics, assess and modify postural abnormalities, address imbalance/dizziness and instruct in home and community integration to attain remaining goals. []  See Plan of Care  []  See progress note/recertification  []  See Discharge Summary         Progress towards goals / Updated goals:  Short Term Goals: To be accomplished in  1  weeks:  1. Pt will be independent and compliant with HEP  Status at last assessment: HEP est at initial eval and will be progress as needed  Current: goal progressing; pt required cueing for all established HEP interventions to improve performance (8/5/22)  Long Term Goals: To be accomplished in  8-10  treatments:  1. Patient will increase FOTO score to 74/100 for indications of increased functional mobility. Status at last assessment: 61/100  Current:   2. Patient will demo (B) symmetrical FIR for improved joint mobility for all swim strokes   Status at last assessment: (R) T7  Current: progressing, right shoulder FIR to T6 with decreased pain than at evaluation (8/9/22)  3. Patient will demo 4+/5 shoulder ER strength for improved GH stability when throwing grandkids. Status at last assessment: 4/5  Current: progressing program with good response and overall less pain (8/18/22)  4.   Patient will report decreased pain to 4/10 at worst for improved sleep quality   Status at last assessment: 7/10  Current: goal met; 2-3/10 worst pain recently (8/15/22)    (Progress Note due by 9/3/22)       PLAN  [x]  Upgrade activities as tolerated     [x]  Continue plan of care  []  Update interventions per flow sheet       []  Discharge due to:_  [x]  Other:progress into higher ROM; assess upper TS mobility for limitations in GHJ OH function      Ravi Mckinley PT 8/18/2022    Future Appointments   Date Time Provider Jose Luis Myers   8/18/2022  5:45 PM Angelica Giles, PT [facility]   8/19/2022  9:15 AM Nichol Gutierrez MD DMA BS AMB   8/22/2022  8:30 AM Angelica Giles, PT [facility]   8/24/2022 10:00 AM Nina Melendez, PT [facility]   8/29/2022  7:45 AM Benjamin Hall., PT [facility]   8/31/2022  7:45 AM Dago Elizabeth., PT [facility]

## 2022-08-19 ENCOUNTER — HOSPITAL ENCOUNTER (OUTPATIENT)
Dept: LAB | Age: 67
Discharge: HOME | End: 2022-08-19
Payer: MEDICARE

## 2022-08-19 ENCOUNTER — OFFICE VISIT (OUTPATIENT)
Dept: FAMILY MEDICINE CLINIC | Age: 67
End: 2022-08-19
Payer: MEDICARE

## 2022-08-19 VITALS
SYSTOLIC BLOOD PRESSURE: 132 MMHG | OXYGEN SATURATION: 97 % | WEIGHT: 182 LBS | DIASTOLIC BLOOD PRESSURE: 82 MMHG | HEIGHT: 72 IN | HEART RATE: 78 BPM | TEMPERATURE: 97.6 F | BODY MASS INDEX: 24.65 KG/M2 | RESPIRATION RATE: 16 BRPM

## 2022-08-19 DIAGNOSIS — E78.00 HYPERCHOLESTEREMIA: ICD-10-CM

## 2022-08-19 DIAGNOSIS — E55.9 VITAMIN D DEFICIENCY: ICD-10-CM

## 2022-08-19 DIAGNOSIS — R35.1 NOCTURIA: ICD-10-CM

## 2022-08-19 DIAGNOSIS — Z12.5 PROSTATE CANCER SCREENING: ICD-10-CM

## 2022-08-19 DIAGNOSIS — Z71.89 ADVANCE CARE PLANNING: ICD-10-CM

## 2022-08-19 DIAGNOSIS — I48.3 TYPICAL ATRIAL FLUTTER (HCC): ICD-10-CM

## 2022-08-19 DIAGNOSIS — Z00.00 MEDICARE ANNUAL WELLNESS VISIT, SUBSEQUENT: Primary | ICD-10-CM

## 2022-08-19 DIAGNOSIS — M75.41 SHOULDER IMPINGEMENT, RIGHT: ICD-10-CM

## 2022-08-19 LAB
25(OH)D3 SERPL-MCNC: 67.1 NG/ML (ref 30–100)
ALBUMIN SERPL-MCNC: 4.6 G/DL (ref 3.4–5)
ALBUMIN/GLOB SERPL: 1.4 {RATIO} (ref 0.8–1.7)
ALP SERPL-CCNC: 84 U/L (ref 45–117)
ALT SERPL-CCNC: 45 U/L (ref 16–61)
ANION GAP SERPL CALC-SCNC: 3 MMOL/L (ref 3–18)
AST SERPL-CCNC: 26 U/L (ref 10–38)
BASOPHILS # BLD: 0.1 K/UL (ref 0–0.1)
BASOPHILS NFR BLD: 1 % (ref 0–2)
BILIRUB SERPL-MCNC: 0.9 MG/DL (ref 0.2–1)
BUN SERPL-MCNC: 21 MG/DL (ref 7–18)
BUN/CREAT SERPL: 15 (ref 12–20)
CALCIUM SERPL-MCNC: 9.8 MG/DL (ref 8.5–10.1)
CHLORIDE SERPL-SCNC: 106 MMOL/L (ref 100–111)
CHOLEST SERPL-MCNC: 182 MG/DL
CO2 SERPL-SCNC: 29 MMOL/L (ref 21–32)
CREAT SERPL-MCNC: 1.41 MG/DL (ref 0.6–1.3)
DIFFERENTIAL METHOD BLD: ABNORMAL
EOSINOPHIL # BLD: 0.2 K/UL (ref 0–0.4)
EOSINOPHIL NFR BLD: 2 % (ref 0–5)
ERYTHROCYTE [DISTWIDTH] IN BLOOD BY AUTOMATED COUNT: 13.3 % (ref 11.6–14.5)
GLOBULIN SER CALC-MCNC: 3.3 G/DL (ref 2–4)
GLUCOSE SERPL-MCNC: 85 MG/DL (ref 74–99)
HCT VFR BLD AUTO: 48.3 % (ref 36–48)
HDLC SERPL-MCNC: 72 MG/DL (ref 40–60)
HDLC SERPL: 2.5 {RATIO} (ref 0–5)
HGB BLD-MCNC: 16.2 G/DL (ref 13–16)
IMM GRANULOCYTES # BLD AUTO: 0 K/UL (ref 0–0.04)
IMM GRANULOCYTES NFR BLD AUTO: 0 % (ref 0–0.5)
LDLC SERPL CALC-MCNC: 93.8 MG/DL (ref 0–100)
LIPID PROFILE,FLP: ABNORMAL
LYMPHOCYTES # BLD: 1.4 K/UL (ref 0.9–3.6)
LYMPHOCYTES NFR BLD: 15 % (ref 21–52)
MCH RBC QN AUTO: 30.6 PG (ref 24–34)
MCHC RBC AUTO-ENTMCNC: 33.5 G/DL (ref 31–37)
MCV RBC AUTO: 91.1 FL (ref 78–100)
MONOCYTES # BLD: 0.6 K/UL (ref 0.05–1.2)
MONOCYTES NFR BLD: 7 % (ref 3–10)
NEUTS SEG # BLD: 6.9 K/UL (ref 1.8–8)
NEUTS SEG NFR BLD: 75 % (ref 40–73)
NRBC # BLD: 0 K/UL (ref 0–0.01)
NRBC BLD-RTO: 0 PER 100 WBC
PLATELET # BLD AUTO: 284 K/UL (ref 135–420)
PMV BLD AUTO: 10.3 FL (ref 9.2–11.8)
POTASSIUM SERPL-SCNC: 4.2 MMOL/L (ref 3.5–5.5)
PROT SERPL-MCNC: 7.9 G/DL (ref 6.4–8.2)
PSA SERPL-MCNC: 1.6 NG/ML (ref 0–4)
RBC # BLD AUTO: 5.3 M/UL (ref 4.35–5.65)
SODIUM SERPL-SCNC: 138 MMOL/L (ref 136–145)
T4 FREE SERPL-MCNC: 0.9 NG/DL (ref 0.7–1.5)
TRIGL SERPL-MCNC: 81 MG/DL (ref ?–150)
TSH SERPL DL<=0.05 MIU/L-ACNC: 4.33 UIU/ML (ref 0.36–3.74)
VLDLC SERPL CALC-MCNC: 16.2 MG/DL
WBC # BLD AUTO: 9.1 K/UL (ref 4.6–13.2)

## 2022-08-19 PROCEDURE — 1101F PT FALLS ASSESS-DOCD LE1/YR: CPT | Performed by: FAMILY MEDICINE

## 2022-08-19 PROCEDURE — 3017F COLORECTAL CA SCREEN DOC REV: CPT | Performed by: FAMILY MEDICINE

## 2022-08-19 PROCEDURE — G8420 CALC BMI NORM PARAMETERS: HCPCS | Performed by: FAMILY MEDICINE

## 2022-08-19 PROCEDURE — G8536 NO DOC ELDER MAL SCRN: HCPCS | Performed by: FAMILY MEDICINE

## 2022-08-19 PROCEDURE — G8432 DEP SCR NOT DOC, RNG: HCPCS | Performed by: FAMILY MEDICINE

## 2022-08-19 PROCEDURE — 99214 OFFICE O/P EST MOD 30 MIN: CPT | Performed by: FAMILY MEDICINE

## 2022-08-19 PROCEDURE — 82306 VITAMIN D 25 HYDROXY: CPT

## 2022-08-19 PROCEDURE — 99497 ADVNCD CARE PLAN 30 MIN: CPT | Performed by: FAMILY MEDICINE

## 2022-08-19 PROCEDURE — 84153 ASSAY OF PSA TOTAL: CPT

## 2022-08-19 PROCEDURE — G0439 PPPS, SUBSEQ VISIT: HCPCS | Performed by: FAMILY MEDICINE

## 2022-08-19 PROCEDURE — 84443 ASSAY THYROID STIM HORMONE: CPT

## 2022-08-19 PROCEDURE — 80061 LIPID PANEL: CPT

## 2022-08-19 PROCEDURE — 36415 COLL VENOUS BLD VENIPUNCTURE: CPT

## 2022-08-19 PROCEDURE — 93000 ELECTROCARDIOGRAM COMPLETE: CPT | Performed by: FAMILY MEDICINE

## 2022-08-19 PROCEDURE — 85025 COMPLETE CBC W/AUTO DIFF WBC: CPT

## 2022-08-19 PROCEDURE — 80053 COMPREHEN METABOLIC PANEL: CPT

## 2022-08-19 PROCEDURE — 84439 ASSAY OF FREE THYROXINE: CPT

## 2022-08-19 PROCEDURE — G8427 DOCREV CUR MEDS BY ELIG CLIN: HCPCS | Performed by: FAMILY MEDICINE

## 2022-08-19 NOTE — PROGRESS NOTES
(AWV) The Medicare Annual Wellness Exam PROGRESS NOTE    This is a Medicare Annual Wellness Exam (AWV)     I have reviewed the patient's medical history in detail and updated the computerized patient record. Fidelia Iverson is a 75 y.o.  male and presents for an annual wellness exam     ROS   Constitutional: Negative for chills and fever. HENT: Negative for congestion, ear pain and sore throat.  he had allergy symptoms 2 months ago and used benadryl with good results. Eyes: Negative for discharge and redness. Respiratory: Negative for cough and shortness of breath. Cardiovascular: Negative for chest pain, palpitations and orthopnea. Gastrointestinal: Negative for abdominal pain, nausea and vomiting. Genitourinary: Negative for frequency and urgency. Skin: Negative for rash. Neurological: Negative for weakness and headaches. msk : positive for shoulder pain   Endo/Heme/Allergies: Does not bruise/bleed easily. All other systems reviewed and are negative. History     Past Medical History:   Diagnosis Date    BPH associated with nocturia     Elevated PSA     Hypercholesterolemia       Past Surgical History:   Procedure Laterality Date    HX ORTHOPAEDIC  2013    back surgery     Current Outpatient Medications   Medication Sig Dispense Refill    atorvastatin (LIPITOR) 40 mg tablet TAKE 1 TABLET BY MOUTH DAILY 90 Tablet 3    ezetimibe (ZETIA) 10 mg tablet TAKE 1 TABLET BY MOUTH DAILY 90 Tab 3    KRILL OIL PO Take  by mouth.      coenzyme q10 (CO Q-10) 300 mg cap Take  by mouth. fexofenadine (ALLEGRA) 180 mg tablet Take  by mouth.      multivitamin (ONE A DAY) tablet Take 1 Tab by mouth daily. fluticasone (FLONASE) 50 mcg/actuation nasal spray 2 Sprays by Both Nostrils route daily. 1 Bottle 0    cholecalciferol, VITAMIN D3, (VITAMIN D3) 5,000 unit tab tablet Take  by mouth daily.        Allergies   Allergen Reactions    Hayfebrol [Chlorpheniramine-Pseudoephed] Runny Nose     NOT ALLERGIC TO THIS PER PATIENT. Family History   Problem Relation Age of Onset    Cancer Father         Liver    Heart Disease Father     Heart Attack Father     Hypertension Sister     Hypertension Brother     Arthritis-rheumatoid Mother      Social History     Tobacco Use    Smoking status: Never    Smokeless tobacco: Never   Substance Use Topics    Alcohol use: Yes     Comment: 2 drinks per day     Patient Active Problem List   Diagnosis Code    Hypercholesteremia E78.00    Advanced care planning/counseling discussion Z71.89    Degenerative disc disease, lumbar M51.36    Negative depression screening Z13.31       Health Maintenance History  Immunizations reviewed, dtap up to date, pneumovax up to date, flu up to date, zoster up to date  colonoscopy:up to date,   Eye exam: up to date    Depression Risk Factor Screening:      Patient Health Questionnaire (PHQ-2)   Over the last 2 weeks, how often have you been bothered by any of the following problems? Little interest or pleasure in doing things? Not at all. [0]  Feeling down, depressed, or hopeless? Not at all. [0]    Total Score: 0/6  PHQ-2 Assessment Scoring:   A score of 2 or more requires further screening with the PHQ-9    Alcohol Risk Factor Screening:     Men: On any occasion during the past 3 months, have you had more than 4 drinks containing alcohol? no  Do you average more than 14 drinks per week? no    Functional Ability and Level of Safety:     Hearing Loss    Hearing is good. Activities of Daily Living   Self-care. Requires assistance with: no ADLs    Fall Risk   No fall risk factors    Abuse Screen   Patient is not abused    Examination   Physical Examination  Vitals:    08/19/22 0931   BP: 132/82   Pulse: 78   Resp: 16   Temp: 97.6 °F (36.4 °C)   TempSrc: Temporal   SpO2: 97%   Weight: 182 lb (82.6 kg)   Height: 6' (1.829 m)   PainSc:   0 - No pain      Body mass index is 24.68 kg/m².      Evaluation of Cognitive Function:  Mood/affect:good mood with appropriate affect  Appearance:well kempt  Family member/caregiver input: n/a    alert, well appearing, and in no distress, oriented to person, place, and time, and normal appearing weight    Patient Care Team:  Verner German, MD as PCP - General (Family Medicine)  Verner German, MD as PCP - [facility] Empaneled Provider    End-of-life planning  Advanced Directive in the case than an injury or illness causes the patient to be unable to make health care decisions    Health Care Directive or Living Will: yes    Advice/Referrals/Counselling/Plan:   Education and counseling provided:  End-of-Life planning (with patient's consent)  Prostate cancer screening tests (PSA, covered annually)  Cardiovascular screening blood test  Diabetes screening test  Include in education list (weight loss, physical activity, smoking cessation, fall prevention, and nutrition)    ICD-10-CM ICD-9-CM    1. Medicare annual wellness visit, subsequent  Z00.00 V70.0 [address]      2. Advance care planning  Z71.89 V65.49 ADVANCE CARE PLANNING FIRST 30 MINS      3. Hypercholesteremia  E78.00 272.0 LIPID PANEL      CBC WITH AUTOMATED DIFF      METABOLIC PANEL, COMPREHENSIVE      TSH 3RD GENERATION      T4, FREE      4. Shoulder impingement, right  M75.41 726.2       5. Prostate cancer screening  Z12.5 V76.44 PSA, DIAGNOSTIC (PROSTATE SPECIFIC AG)      6. Typical atrial flutter (HCC)  I48.3 427.32 AMB POC EKG ROUTINE W/ 12 LEADS, INTER & REP      REFERRAL TO CARDIOLOGY      apixaban (ELIQUIS) 5 mg tablet      7. Nocturia  R35.1 788.43 PSA, DIAGNOSTIC (PROSTATE SPECIFIC AG)      8. Vitamin D deficiency  E55.9 268.9 VITAMIN D, 25 HYDROXY      . Brief written plan, checklist    I have discussed the diagnosis with the patient and the intended plan as seen in the above orders. The patient has received an after-visit summary and questions were answered concerning future plans.   I have discussed medication side effects and warnings with the patient as well. I have reviewed the plan of care with the patient, accepted their input and they are in agreement with the treatment goals. ____________________________________________________________    Problem Assessment    for treatment of   Chief Complaint   Patient presents with    Annual Wellness Visit         SUBJECTIVE    Well Adult Physical   Patient here for a comprehensive physical exam.The patient reports problems - right shoulder pain after fall 3 months ago; he fell from a ladder and went to the ER and had a laceration repair. He continued to have pain and went to ortho. He had a corticosteroid injection with minimal relief; he has been in physical therapy for 2 weeks with twice weekly treatment. He feels better this week and feels better. Do you take any herbs or supplements that were not prescribed by a doctor? yes Are you taking calcium supplements? yes Are you taking aspirin daily? not applicable    Cardiovascular Review:  The patient has hyperlipidemia. Diet and Lifestyle: generally follows a low fat low cholesterol diet, generally follows a low sodium diet, follows a diabetic diet regularly, exercises regularly, nonsmoker  Home BP Monitoring: is not measured at home. Pertinent ROS: taking medications as instructed, no medication side effects noted, no TIA's, no chest pain on exertion, no dyspnea on exertion, no swelling of ankles. Visit Vitals  /82 (BP 1 Location: Right upper arm, BP Patient Position: Sitting, BP Cuff Size: Adult)   Pulse 78   Temp 97.6 °F (36.4 °C) (Temporal)   Resp 16   Ht 6' (1.829 m)   Wt 182 lb (82.6 kg)   SpO2 97%   BMI 24.68 kg/m²     General:  Alert, cooperative, no distress, appears stated age. Head:  Normocephalic, without obvious abnormality, atraumatic. Eyes:  Conjunctivae/corneas clear. PERRL, EOMs intact. Ears:  Normal TMs and external ear canals both ears. Nose: Nares normal. Septum midline.  Mucosa normal. No drainage or sinus tenderness. Throat: Lips, mucosa, and tongue normal. Teeth and gums normal.   Neck: Supple, symmetrical, trachea midline, no adenopathy, thyroid: no enlargement/tenderness/nodules, no carotid bruit and no JVD. Back:   Symmetric, no curvature. ROM normal. No CVA tenderness. Lungs:   Clear to auscultation bilaterally. Chest wall:  No tenderness or deformity. Heart:  Irregular rhythm, tachycardia, S1, S2 normal, no murmur, click, rub or gallop. Abdomen:   Soft, non-tender. Bowel sounds normal. No masses,  No organomegaly. Genitalia:  deferred   Rectal:  deferred   Extremities: Extremities normal, atraumatic, no cyanosis or edema. Pulses: 2+ and symmetric all extremities. Skin: Skin color, texture, turgor normal. No rashes or lesions   Lymph nodes: Cervical, supraclavicular, and axillary nodes normal.   Neurologic: CNII-XII intact. Normal strength, sensation and reflexes throughout. LABS     TESTS  EkG - atrial flutter    Assessment/Plan:    1. Medicare annual wellness visit, subsequent  Reviewed preventive recommendations  - [address] [place] [address] 3scale    2. Advance care planning  See ACP  - ADVANCE CARE PLANNING FIRST 30 MINS    3. Hypercholesteremia  Assess efficacy of treatment  - LIPID PANEL; Future  - CBC WITH AUTOMATED DIFF; Future  - METABOLIC PANEL, COMPREHENSIVE; Future  - TSH 3RD GENERATION; Future  - T4, FREE; Future    4. Shoulder impingement, right  Home exercises demonstrated    5. Prostate cancer screening    - PSA, DIAGNOSTIC (PROSTATE SPECIFIC AG); Future    6. Typical atrial flutter (HCC)  Start anticoagulation; follow up with cardiology; precautions reviewed for emergency evaluation  - AMB POC EKG ROUTINE W/ 12 LEADS, INTER & REP  - REFERRAL TO CARDIOLOGY  - apixaban (ELIQUIS) 5 mg tablet; Take 1 Tablet by mouth two (2) times a day. Dispense: 60 Tablet; Refill: 1    7.  Nocturia  Evaluate for sign of prostate cancer  - PSA, DIAGNOSTIC (PROSTATE SPECIFIC AG); Future    8.  Vitamin D deficiency  Assess for efficacy of treatment  - VITAMIN D, 25 HYDROXY; Future    Lab review: orders written for new lab studies as appropriate; see orders

## 2022-08-19 NOTE — PROGRESS NOTES
Yao Awad presents today for   Chief Complaint   Patient presents with    Annual Wellness Visit       Is someone accompanying this pt? no    Is the patient using any DME equipment during OV? no    Depression Screening:  3 most recent PHQ Screens 8/19/2022   Little interest or pleasure in doing things Not at all   Feeling down, depressed, irritable, or hopeless Not at all   Total Score PHQ 2 0       Learning Assessment:  Learning Assessment 6/4/2019   PRIMARY LEARNER Patient   HIGHEST LEVEL OF EDUCATION - PRIMARY LEARNER  > 4 YEARS OF COLLEGE   BARRIERS PRIMARY LEARNER NONE   CO-LEARNER CAREGIVER No   PRIMARY LANGUAGE ENGLISH   LEARNER PREFERENCE PRIMARY READING   ANSWERED BY patient   RELATIONSHIP SELF       Abuse Screening:  Abuse Screening Questionnaire 6/4/2019   Do you ever feel afraid of your partner? N   Are you in a relationship with someone who physically or mentally threatens you? N   Is it safe for you to go home? Y       Fall Risk  Fall Risk Assessment, last 12 mths 8/19/2022   Able to walk? Yes   Fall in past 12 months? 0   Do you feel unsteady? 0   Are you worried about falling 0       Health Maintenance reviewed and discussed and ordered per Provider. Health Maintenance Due   Topic Date Due    COVID-19 Vaccine (1) Never done    Shingrix Vaccine Age 50> (2 of 2) 08/17/2020    Depression Screen  07/01/2022    Lipid Screen  07/01/2022    Medicare Yearly Exam  07/02/2022   . Coordination of Care:  1. Have you been to the ER, urgent care clinic since your last visit? Hospitalized since your last visit? Yes, 5/14/22, [facility], left elbow laceration. 2. Have you seen or consulted any other health care providers outside of the [address] [place] [address] since your last visit? Include any pap smears or colon screening.  no      Last  Checked na  Last UDS Checked na  Last Pain contract signed: na    Annual Medicare Wellness Exam

## 2022-08-22 ENCOUNTER — HOSPITAL ENCOUNTER (OUTPATIENT)
Dept: PHYSICAL THERAPY | Age: 67
Discharge: HOME | End: 2022-08-22
Payer: MEDICARE

## 2022-08-22 PROCEDURE — 97110 THERAPEUTIC EXERCISES: CPT

## 2022-08-22 PROCEDURE — 97140 MANUAL THERAPY 1/> REGIONS: CPT

## 2022-08-22 PROCEDURE — 97112 NEUROMUSCULAR REEDUCATION: CPT

## 2022-08-22 NOTE — PROGRESS NOTES
PT DAILY TREATMENT NOTE     Patient Name: Cristiana Perez  Date:2022  : 1955  [x]  Patient  Verified  Payor: David Salas / Plan: [address] [place] [address] HMO / Product Type: Managed Care Medicare /    In time: 8:35           Out time: 9:33  Total Treatment Time (min): 58  Visit #: 7 of 8-10    Medicare/BCBS Only   Total Timed Codes (min):  48 1:1 Treatment Time:  48       Treatment Area: Right shoulder pain [M25.511]    SUBJECTIVE  Pain Level (0-10 scale): 1  Any medication changes, allergies to medications, adverse drug reactions, diagnosis change, or new procedure performed?: [x] No    [] Yes (see summary sheet for update)  \"I was sore on Thursday - good sore - but then it felt better. I did my exercises on Saturday and it felt really good then. So it was a good sore because I improved. OBJECTIVE    Modality rationale: decrease pain to improve the patients ability to relax and sleep following therapy session to improve restorative sleep patterns.     Min Type Additional Details   10 [x]  Ice  Position: supine with LEs elevated on wedge  Location: left shoulder   [x] Skin assessment post-treatment:  [x]intact []redness- no adverse reaction    []redness - adverse reaction:     22 min Therapeutic Exercise:  [x] See flow sheet:   -open books: progressed to rib integrator and done bilaterally (tightness noted on the R as well)  -Foam roll TS extension   - prone ER at 90: progressed to stand with blue tband to improve overall need for core and scapular control. - added isometric hold, and also 10\" shake for stability in swimming  -added alternating lat pull downs (holding in a lat pull position with alternating release)     NV as needed:   -standing crossover stretch at wall  -cable column - tee- joint mobs  -[place] inferior joint glide  -Suitcase carry with 20# KB for inferior GHJ glide and also scapular stability and rotational stab for swimming     Rationale: increase ROM and increase strength to improve the patients ability to perform ADLs with improved shoulder/elbow strength and mobility. 15 min Neuromuscular Re-education:  [x]  See flow sheet:     -body blade: at 120 scaption; ER at 90/90 (anterior to posterior sway)  -SA wall slide with green tband to progress  -added prone over SB Y, I and W with 2# weight    Rationale: increase ROM, increase strength, improve coordination, improve balance and increase proprioception  to improve the patients ability to perform overhead functional lifts with improved scapular stabilization and neutral cervical posture. 11 min Manual Therapy: grade II/III posterior/inferior mobilizations at mid range abduction/end range flexion respectively; grade II/III anterior mobs in prone; Posterior mobs in crossover position; GHJ distraction mobs, grade IV; DTM to (R) pectoralis major with TrP release;     PA glides to T5-8 with an emphasis on T7   The manual therapy interventions were performed at a separate and distinct time from the therapeutic activities interventions. Rationale: decrease pain, increase ROM, increase tissue extensibility, decrease trigger points and increase postural awareness to improve ease of ADLs in the home environment. With   [x] TE   [x] TA   [] neuro   [] other: Patient Education: [x] Review HEP  -rib integrator  -foam roll extensions        Other Objective/Functional Measures:   TTP and TrP in the R pec major and minor    TS mobilitY: restricted at T7  Progress higher GHJ ROM     Pain Level (0-10 scale) post treatment:0    ASSESSMENT/Changes in Function:   rESTRICTIONS in the TS addressed today with MT and foam roll interventions. Great progress with OH interventions without c/o pain and with improved GHJ mechanics for OH reaching and eventual return to swim.      Patient will continue to benefit from skilled PT services to modify and progress therapeutic interventions, address functional mobility deficits, address ROM deficits, address strength deficits, analyze and address soft tissue restrictions, analyze and cue movement patterns, analyze and modify body mechanics/ergonomics, assess and modify postural abnormalities, address imbalance/dizziness and instruct in home and community integration to attain remaining goals. []  See Plan of Care  []  See progress note/recertification  []  See Discharge Summary         Progress towards goals / Updated goals:  Short Term Goals: To be accomplished in  1  weeks:  1. Pt will be independent and compliant with HEP  Status at last assessment: HEP est at initial eval and will be progress as needed  Current: goal progressing; pt required cueing for all established HEP interventions to improve performance (8/5/22)  Long Term Goals: To be accomplished in  8-10  treatments:  1. Patient will increase FOTO score to 74/100 for indications of increased functional mobility. Status at last assessment: 61/100  Current:   2. Patient will demo (B) symmetrical FIR for improved joint mobility for all swim strokes   Status at last assessment: (R) T7  Current: progressing, right shoulder FIR to T6 with decreased pain than at evaluation (8/9/22)  3. Patient will demo 4+/5 shoulder ER strength for improved GH stability when throwing grandkids. Status at last assessment: 4/5  Current: progressing program with good response and overall less pain (8/22/22)  4. Patient will report decreased pain to 4/10 at worst for improved sleep quality   Status at last assessment: 7/10  Current: goal met; 2-3/10 worst pain recently (8/15/22)    (Progress Note due by 9/3/22)       PLAN  [x]  Upgrade activities as tolerated     [x]  Continue plan of care  []  Update interventions per flow sheet       []  Discharge due to:_  [x]  Other:con't to progress to return to swim.      Angeline Holland, PT 8/22/2022    Future Appointments   Date Time Provider Jose Luis Myers   8/22/2022  8:30 AM DEISI Dudley [facility] 8/24/2022 10:00 AM Elvin Beard, PT [facility]   8/29/2022  7:45 AM Nayana Negrete., PT [facility]   8/31/2022  7:45 AM Nayana Negrete., PT [facility]   9/2/2022  2:30 PM Hector Aguilar MD [facility]

## 2022-08-24 ENCOUNTER — HOSPITAL ENCOUNTER (OUTPATIENT)
Dept: PHYSICAL THERAPY | Age: 67
Discharge: HOME | End: 2022-08-24
Payer: MEDICARE

## 2022-08-24 PROCEDURE — 97110 THERAPEUTIC EXERCISES: CPT

## 2022-08-24 PROCEDURE — 97140 MANUAL THERAPY 1/> REGIONS: CPT

## 2022-08-24 PROCEDURE — 97112 NEUROMUSCULAR REEDUCATION: CPT

## 2022-08-24 NOTE — PROGRESS NOTES
PT DAILY TREATMENT NOTE     Patient Name: Fredy Curling  Date:2022  : 1955  [x]  Patient  Verified  Payor: [place] MEDICARE / Plan: [place] MEDICARE PART A & B / Product Type: Medicare /    In time: 1004         Out time: 4726  Total Treatment Time (min): 59  Visit #: 8 of -10    Medicare/BCBS Only   Total Timed Codes (min):  49 1:1 Treatment Time: 49       Treatment Area: Right shoulder pain [M25.511]    SUBJECTIVE  Pain Level (0-10 scale): 0-1/10  Any medication changes, allergies to medications, adverse drug reactions, diagnosis change, or new procedure performed?: [x] No    [] Yes (see summary sheet for update)  Pt notes only mild achiness in shoulder today, states he is sleeping much better. OBJECTIVE    Modality rationale: decrease pain to improve the patients ability to relax and sleep following therapy session to improve restorative sleep patterns. Min Type Additional Details   10 [x]  Ice  Position: supine with LEs elevated on wedge  Location: left shoulder   [x] Skin assessment post-treatment:  [x]intact []redness- no adverse reaction    []redness - adverse reaction:     24 min Therapeutic Exercise:  [x] See flow sheet:   -Suitcase carry with 20# KB for inferior GHJ glide and also scapular stability and rotational stab for swimming    -added alternating lat pull downs (holding in a lat pull position with alternating release) at [employer], 18# with cues to reduce scap hiking   -Ostrander inferior joint glide in 1/2 kneel, 18#  -Open books with rib integrator B, added 2#    NV: pec stretch on foam roller, cross over stretch at wall   Rationale: increase ROM and increase strength to improve the patients ability to perform ADLs with improved shoulder/elbow strength and mobility.      15 min Neuromuscular Re-education:  [x]  See flow sheet:   -body blade in 1/2 kneel on dynadisc: at 120 scaption; ER at 90/90 (anterior to posterior sway), inf/sup in scaption   -SA wall slide + lift off with Blue TB  -prone on plinth Y, I and W with 0#   Rationale: increase ROM, increase strength, improve coordination, improve balance and increase proprioception  to improve the patients ability to perform overhead functional lifts with improved scapular stabilization and neutral cervical posture. 10 min Manual Therapy: grade II/III posterior/inferior mobilizations at mid range abduction/end range flexion respectively; grade II/III anterior mobs in prone; Posterior mobs in crossover position; GHJ distraction mobs, grade IV; DTM to (R) pectoralis major with TrP release and PROM into 90 abd/90 ER    (NT)PA glides to T5-8 with an emphasis on T7   The manual therapy interventions were performed at a separate and distinct time from the therapeutic activities interventions. Rationale: decrease pain, increase ROM, increase tissue extensibility, decrease trigger points and increase postural awareness to improve ease of ADLs in the home environment. With   [x] TE   [x] TA   [] neuro   [] other: Patient Education: [x] Review HEP    Pt notes goal to return to swim in September, PT recommended slow initiation d/t deconditioning and maintaining kinesthetic awareness of scapular technique and        Other Objective/Functional Measures:   TTP and TrP in the R pec major and minor  R shoulder FIR to T8 with \"more stiff than pain\"    Pain Level (0-10 scale) post treatment: 0    ASSESSMENT/Changes in Function:   Pt cont to demo good progress with interventions and kinesthetic awareness of GHJ and scapular mobility. Prone letter series regressed to no weight for decr UT recruitment. FIR noted to be slightly reduced from last assessment (T8 to T7), addressed with TrP release to R pec, pt could benefit from pt [facility] for self-massage of pec.      Patient will continue to benefit from skilled PT services to modify and progress therapeutic interventions, address functional mobility deficits, address ROM deficits, address strength deficits, analyze and address soft tissue restrictions, analyze and cue movement patterns, analyze and modify body mechanics/ergonomics, assess and modify postural abnormalities, address imbalance/dizziness and instruct in home and community integration to attain remaining goals. []  See Plan of Care  []  See progress note/recertification  []  See Discharge Summary         Progress towards goals / Updated goals:  Short Term Goals: To be accomplished in  1  weeks:  1. Pt will be independent and compliant with HEP  Status at last assessment: HEP est at initial eval and will be progress as needed  Current: goal progressing; pt required cueing for all established HEP interventions to improve performance (8/5/22)  Long Term Goals: To be accomplished in  8-10  treatments:  1. Patient will increase FOTO score to 74/100 for indications of increased functional mobility. Status at last assessment: 61/100  Current:   2. Patient will demo (B) symmetrical FIR for improved joint mobility for all swim strokes   Status at last assessment: (R) T7  Current: progressing, right shoulder FIR to T8 with \"more stiff than pain\" (8/24/22)  3. Patient will demo 4+/5 shoulder ER strength for improved GH stability when throwing grandkids. Status at last assessment: 4/5  Current: progressing program with good response and overall less pain (8/22/22)  4. Patient will report decreased pain to 4/10 at worst for improved sleep quality   Status at last assessment: 7/10  Current: goal met; 2-3/10 worst pain recently (8/15/22)    (Progress Note due by 9/3/22)       PLAN  [x]  Upgrade activities as tolerated     [x]  Continue plan of care  []  Update interventions per flow sheet       []  Discharge due to:_  [x]  Other:con't to progress to return to swim.      Ricki White, PT 8/24/2022    Future Appointments   Date Time Provider Jose Luis Myers   8/24/2022 10:00 AM Rosie Coats, PT [facility]   8/29/2022  7:45 AM Jay Jimenez., PT [facility]   8/31/2022  7:45 AM Vassie Peabody., PT [facility]   9/2/2022  2:30 PM Karen Salinas MD [facility] AMB

## 2022-08-29 ENCOUNTER — HOSPITAL ENCOUNTER (OUTPATIENT)
Dept: PHYSICAL THERAPY | Age: 67
Discharge: HOME | End: 2022-08-29
Payer: MEDICARE

## 2022-08-29 PROCEDURE — 97140 MANUAL THERAPY 1/> REGIONS: CPT

## 2022-08-29 PROCEDURE — 97110 THERAPEUTIC EXERCISES: CPT

## 2022-08-29 PROCEDURE — 97112 NEUROMUSCULAR REEDUCATION: CPT

## 2022-08-29 NOTE — PROGRESS NOTES
PT DAILY TREATMENT NOTE     Patient Name: Jenelle Kaur  Date:2022  : 1955  [x]  Patient  Verified  Payor: [place] / Plan: [place] MEDICARE PART A & B / Product Type: Medicare /    In time: 969         Out time: 385  Total Treatment Time (min): 63  Visit #: 9 of 8-10    Medicare/BCBS Only   Total Timed Codes (min):  53 1:1 Treatment Time: 595 - 745(31)       Treatment Area: Right shoulder pain [M25.511]    SUBJECTIVE  Pain Level (0-10 scale): 1/ 10  Any medication changes, allergies to medications, adverse drug reactions, diagnosis change, or new procedure performed?: [x] No    [] Yes (see summary sheet for update)  Pt reports lateral shoulder pain and soreness after last session. OBJECTIVE    Modality rationale: decrease pain to improve the patients ability to relax and sleep following therapy session to improve restorative sleep patterns. Min Type Additional Details   10 [x]  Ice  Position: supine with LEs elevated on wedge  Location: left shoulder   [x] Skin assessment post-treatment:  [x]intact []redness- no adverse reaction    []redness - adverse reaction:     15 min Therapeutic Exercise:  [x] See flow sheet:      Rationale: increase ROM and increase strength to improve the patients ability to perform ADLs with improved shoulder/elbow strength and mobility. 30 min Neuromuscular Re-education:  [x]  See flow sheet: postural education / scap setting    Rationale: increase ROM, increase strength, improve coordination, improve balance and increase proprioception  to improve the patients ability to perform overhead functional lifts with improved scapular stabilization and neutral cervical posture.     8 min Manual Therapy: grade 3-4 joint mobs for distraction, inferior glide at end range flexion and posterior glide at end range flexion , lat release    The manual therapy interventions were performed at a separate and distinct time from the therapeutic activities interventions. Rationale: decrease pain, increase ROM, increase tissue extensibility, decrease trigger points and increase postural awareness to improve ease of ADLs in the home environment. With  rx Patient Education: [x] Review HEP, gave BTB , maxing out strength training of RTC at 15-20 min to avoid over use        Other Objective/Functional Measures:     Improvement: sleeping better/ lying on R side better , \"theres not question I am getting better. \"  Deficits: \"I overdid my exercises\", return to swimming  sec to other medical issue, fear avoidance    Strength - noted R shoulder deficit in prone T compare to L     Pain Level (0-10 scale) post treatment: 0    ASSESSMENT/Changes in Function:   Patient progressing well. Last scheduled apt 8/31 - patient educated on upcoming reassessment - patient feels comfortable with likely DC to HEP . Freq VCing for proper scap setting during treatment to avoid impingement sx. DC 2# wt with rib integrator to protect RTC and focus on mobility . Modified body blade to TB for reproduction at home. Patient will continue to benefit from skilled PT services to modify and progress therapeutic interventions, address functional mobility deficits, address ROM deficits, address strength deficits, analyze and address soft tissue restrictions, analyze and cue movement patterns, analyze and modify body mechanics/ergonomics, assess and modify postural abnormalities, address imbalance/dizziness and instruct in home and community integration to attain remaining goals. [x]  See Plan of Care  []  See progress note/recertification  []  See Discharge Summary         Progress towards goals / Updated goals:  Short Term Goals: To be accomplished in  1  weeks:  1.   Pt will be independent and compliant with HEP  Status at last assessment: HEP est at initial eval and will be progress as needed  Current: goal progressing; pt required cueing for all established HEP interventions to improve performance (8/5/22), cont compliance 8/29    Long Term Goals: To be accomplished in  8-10  treatments:  1. Patient will increase FOTO score to 74/100 for indications of increased functional mobility. Status at last assessment: 61/100  Current:     2. Patient will demo (B) symmetrical FIR for improved joint mobility for all swim strokes   Status at last assessment: (R) T7  Current: progressing, right shoulder FIR to T8 with \"more stiff than pain\" (8/24/22)    3. Patient will demo 4+/5 shoulder ER strength for improved GH stability when throwing grandkids. Status at last assessment: 4/5  Current: progressing program with good response and overall less pain (8/22/22)    4.   Patient will report decreased pain to 4/10 at worst for improved sleep quality   Status at last assessment: 7/10  Current: goal met; 2-3/10 worst pain recently (8/15/22)    (Progress Note due by 9/3/22)       PLAN  [x]  Upgrade activities as tolerated     [x]  Continue plan of care  []  Update interventions per flow sheet       []  Discharge due to:_  [x]  Other:reassessment NV for likely DC to [facility], PT 8/29/2022    Future Appointments   Date Time Provider Jose Luis Myers   8/29/2022  7:45 AM Benjamin Hall., PT [facility]   8/31/2022  7:45 AM Benjamin Hall., PT [facility]   9/2/2022  2:30 PM Jacoby Rosado MD [facility] BS AMB

## 2022-08-31 ENCOUNTER — HOSPITAL ENCOUNTER (OUTPATIENT)
Dept: PHYSICAL THERAPY | Age: 67
Discharge: HOME | End: 2022-08-31
Payer: MEDICARE

## 2022-08-31 ENCOUNTER — APPOINTMENT (OUTPATIENT)
Dept: PHYSICAL THERAPY | Age: 67
End: 2022-08-31
Payer: MEDICARE

## 2022-08-31 PROCEDURE — 97112 NEUROMUSCULAR REEDUCATION: CPT

## 2022-08-31 PROCEDURE — 97110 THERAPEUTIC EXERCISES: CPT

## 2022-08-31 NOTE — PROGRESS NOTES
PT DAILY TREATMENT NOTE     Patient Name: Fredy Curling  KHN  : 1955  [x]  Patient  Verified  Payor: [place] MEDICARE / Plan: [place] MEDICARE PART A & B / Product Type: Medicare /    In time: 004         Out time: 189  Total Treatment Time (min): 44  Visit #: 10 of 8-10    Medicare/BCBS Only   Total Timed Codes (min):  44 1:1 Treatment Time:44       Treatment Area: Right shoulder pain [M25.511]    SUBJECTIVE  Pain Level (0-10 scale): 1/ 10  Any medication changes, allergies to medications, adverse drug reactions, diagnosis change, or new procedure performed?: [x] No    [] Yes (see summary sheet for update)  Pt reports he is ready for DC to HEP     OBJECTIVE    Modality rationale: decrease pain to improve the patients ability to relax and sleep following therapy session to improve restorative sleep patterns. Min Type Additional Details   PD [x]  Ice  Position: supine with LEs elevated on wedge  Location: left shoulder   [x] Skin assessment post-treatment:  [x]intact []redness- no adverse reaction    []redness - adverse reaction:     34 min Therapeutic Exercise:  [x] See flow sheet:  REASSESSMENT/ DISTANT SUPERVISION FOR FOTO to be able to answer questions      Rationale: increase ROM and increase strength to improve the patients ability to perform ADLs with improved shoulder/elbow strength and mobility. 10 min Neuromuscular Re-education:  [x]  See flow sheet: postural education / scap setting    Rationale: increase ROM, increase strength, improve coordination, improve balance and increase proprioception  to improve the patients ability to perform overhead functional lifts with improved scapular stabilization and neutral cervical posture.     0 min Manual Therapy: grade 3-4 joint mobs for distraction, inferior glide at end range flexion and posterior glide at end range flexion , lat release    The manual therapy interventions were performed at a separate and distinct time from the therapeutic activities interventions. Rationale: decrease pain, increase ROM, increase tissue extensibility, decrease trigger points and increase postural awareness to improve ease of ADLs in the home environment. With rx Patient Education: [x] Review HEP for DC, DC instructions   Concept of DOMS     Pushup with plus   SA plank  Lat stretch        Other Objective/Functional Measures:     Goals assessed for DC  Pain at best 0/10, at worst 3/10  Subjective % improvement 75%  Objective:   R shoulder AROM flexion 168, scaption 165, ER 60 B , FIR B sym T5 mild pain   R shoulder strength flexion 5/5 , scaption 4+, ER 5/5, IR 5/5   Postural strength : SA 4+, MT 4. LT 4  Speeds : negative   Improvement: sleeping better/ lying on R side better , \"theres not question I am getting better. \" \"Theres nothing that I cant really do\" pain is manageable   Deficits: \"I over did my exercises\", return to swimming  sec to other medical issue, fear avoidance    Pain Level (0-10 scale) post treatment: 0    ASSESSMENT/Changes in Function:   [x]  See Discharge Summary         Progress towards goals / Updated goals:  Short Term Goals: To be accomplished in  1  weeks:  1. Pt will be independent and compliant with HEP  Status at last assessment: HEP est at initial eval and will be progress as needed  Current: goal met - patient compliant with HEP    Long Term Goals: To be accomplished in  8-10  treatments:  1. Patient will increase FOTO score to 74/100 for indications of increased functional mobility. Status at last assessment: 61/100  Current:  goal not met - 60/100 however patient reports he is verbally better     2. Patient will demo (B) symmetrical FIR for improved joint mobility for all swim strokes   Status at last assessment: (R) T7  Current: goal met at T5     3. Patient will demo 4+/5 shoulder ER strength for improved GH stability when throwing grandkids. Status at last assessment: 4/5  Current: goal met at 5/5     4.   Patient will report decreased pain to 4/10 at worst for improved sleep quality   Status at last assessment: 7/10  Current: goal met; 2-3/10 worst pain recently       PLAN    [x]  Discharge due to:_program complete, goals met/ progressing     Janelle Lloyd, DEISI 8/31/2022    Future Appointments   Date Time Provider Jose Luis Myers   8/31/2022  7:45 AM Krunal Olmedo., PT [facility]   9/2/2022  2:30 PM Jesus Carreon MD [facility] BS AMB

## 2022-08-31 NOTE — PROGRESS NOTES
[facility] AT [address], [place], [place]  Phone: [phone] Fax [phone] SUMMARY  Patient Name: Osmany Smith : 1955   Treatment/Medical Diagnosis: Right shoulder pain [M25.511]   Referral Source: Roger Worley MD     Date of Initial Visit: 8/3/2022 Attended Visits: 10 Missed Visits: 0     SUMMARY OF TREATMENT  Patient was being treated for R shoulder pain/ impingement sec to [facility] injury. Patient treatment has included TE, NMRE, manual, HEP progression and modalities PRN. CURRENT STATUS  Patient has made excellent progress in 10 sessions and feels he has the tools and knowledge to continue with progress via HEP at this time. Assessment as follows:  Pain at best 0/10, at worst 3/10  Subjective % improvement 75%  Objective:   R shoulder AROM flexion 168, scaption 165, ER 60 B , FIR B sym T5 mild pain   R shoulder strength flexion 5/5 , scaption 4+, ER 5/5, IR 5/5   Postural strength : SA 4+, MT 4. LT 4  Speeds : negative   Improvement: sleeping better/ lying on R side better , \"theres not question I am getting better. \" \"Theres nothing that I cant really do\" pain is manageable   Deficits: \"I over did my exercises\", return to swimming  sec to other medical issue, fear avoidance           Progress towards goals / Updated goals:  Short Term Goals: To be accomplished in  1  weeks:  1. Pt will be independent and compliant with HEP  Status at last assessment: HEP est at initial eval and will be progress as needed  Current: goal met - patient compliant with HEP    Long Term Goals: To be accomplished in  8-10  treatments:  1. Patient will increase FOTO score to 74/100 for indications of increased functional mobility. Status at last assessment: 61/100  Current:  goal not met - 60/100 however patient reports he is verbally better     2.   Patient will demo (B) symmetrical FIR for improved joint mobility for all swim strokes   Status at last assessment: (R) T7  Current: goal met at T5     3. Patient will demo 4+/5 shoulder ER strength for improved GH stability when throwing grandkids. Status at last assessment: 4/5  Current: goal met at 5/5     4. Patient will report decreased pain to 4/10 at worst for improved sleep quality   Status at last assessment: 7/10  Current: goal met; 2-3/10 worst pain recently      RECOMMENDATIONS  Discontinue therapy. Progressing towards or have reached established goals. If you have any questions/comments please contact us directly at [phone]. Thank you for allowing us to assist in the care of your patient.   Therapist Signature: Tegan Raymond, PT Date: 8/31/2022   Reporting Period: 8/3/2022-8/31/2022 Time: 167T

## 2022-09-01 NOTE — ACP (ADVANCE CARE PLANNING)
Advance Care Planning     Advance Care Planning (ACP) Physician/NP/PA Conversation      Date of Conversation: 8/19/2022  Conducted with: Patient with Decision Making Capacity    Healthcare Decision Maker:     Primary Decision Maker: Chaparrita Matos Spouse - [phone]    Secondary Decision Maker: Reza Scales - [phone]  Click here to complete [address] including selection of the Healthcare Decision Maker Relationship (ie \"Primary\")      Today we documented Decision Maker(s) consistent with Legal Next of Kin hierarchy. Care Preferences:    Hospitalization: \"If your health worsens and it becomes clear that your chance of recovery is unlikely, what would be your preference regarding hospitalization? \"  The patient would prefer hospitalization. Ventilation: \"If you were unable to breathe on your own and your chance of recovery was unlikely, what would be your preference about the use of a ventilator (breathing machine) if it was available to you? \"   The patient would desire the use of a ventilator. Resuscitation: \"In the event your heart stopped as a result of an underlying serious health condition, would you want attempts to be made to restart your heart, or would you prefer a natural death? \"   Yes, attempt to resuscitate.     Additional topics discussed: treatment goals, benefit/burden of treatment options, ventilation preferences, hospitalization preferences, and resuscitation preferences    Conversation Outcomes / Follow-Up Plan:   ACP complete - no further action today  Reviewed DNR/DNI and patient elects Full Code (Attempt Resuscitation)     Length of Voluntary ACP Conversation in minutes:  16 minutes    Shanti Raphael MD

## 2022-09-02 ENCOUNTER — OFFICE VISIT (OUTPATIENT)
Dept: CARDIOLOGY CLINIC | Age: 67
End: 2022-09-02
Payer: MEDICARE

## 2022-09-02 DIAGNOSIS — I48.91 ATRIAL FIBRILLATION, UNSPECIFIED TYPE (HCC): Primary | ICD-10-CM

## 2022-09-02 PROCEDURE — G8536 NO DOC ELDER MAL SCRN: HCPCS | Performed by: INTERNAL MEDICINE

## 2022-09-02 PROCEDURE — G8428 CUR MEDS NOT DOCUMENT: HCPCS | Performed by: INTERNAL MEDICINE

## 2022-09-02 PROCEDURE — 1123F ACP DISCUSS/DSCN MKR DOCD: CPT | Performed by: INTERNAL MEDICINE

## 2022-09-02 PROCEDURE — 1101F PT FALLS ASSESS-DOCD LE1/YR: CPT | Performed by: INTERNAL MEDICINE

## 2022-09-02 PROCEDURE — 93000 ELECTROCARDIOGRAM COMPLETE: CPT | Performed by: INTERNAL MEDICINE

## 2022-09-02 PROCEDURE — G8420 CALC BMI NORM PARAMETERS: HCPCS | Performed by: INTERNAL MEDICINE

## 2022-09-02 PROCEDURE — 99204 OFFICE O/P NEW MOD 45 MIN: CPT | Performed by: INTERNAL MEDICINE

## 2022-09-02 PROCEDURE — G8432 DEP SCR NOT DOC, RNG: HCPCS | Performed by: INTERNAL MEDICINE

## 2022-09-02 PROCEDURE — 3017F COLORECTAL CA SCREEN DOC REV: CPT | Performed by: INTERNAL MEDICINE

## 2022-09-02 RX ORDER — CYANOCOBALAMIN (VITAMIN B-12) 1000 MCG
TABLET, EXTENDED RELEASE ORAL
COMMUNITY
Start: 2021-07-01

## 2022-09-02 RX ORDER — MENTHOL
1000 GEL (GRAM) TOPICAL DAILY
COMMUNITY

## 2022-09-02 RX ORDER — ASPIRIN 81 MG/1
81 TABLET ORAL DAILY
Qty: 90 TABLET | Refills: 2 | Status: SHIPPED | OUTPATIENT
Start: 2022-09-02

## 2022-09-02 RX ORDER — ZINC SULFATE 50(220)MG
CAPSULE ORAL
COMMUNITY
Start: 2021-03-01

## 2022-09-02 NOTE — PROGRESS NOTES
[facility]    Mr. Tsering Clayton is a 56-year-old male with a history of paroxysmal atrial flutter, BPH, hyperlipidemia    Patient is here today for cardiac evaluation. Denies any prior history of MI or CHF  Patient was sent to me for evaluation of atrial flutter. Patient was at PCP office and EKG showed atrial flutter, asymptomatic. Patient was started apixaban however with potential bleeding side effect, patient did not take apixaban since that initial start. He tells me that he is very active and he performs exercise regularly. He is in a swim club and he swim 3000 m in a session 3 times a week without any limitation of activity. He denies any chest pain or chest tightness. He denies any palpitation, presyncope or syncope. He denies any fatigue, presyncope or syncope  Denies any nausea, vomiting, abdominal pain, fever, chills, sputum production. No hematuria or other bleeding complaints    Past Medical History:   Diagnosis Date    BPH associated with nocturia     Elevated PSA     Hypercholesterolemia        Review of Systems:  Cardiac symptoms as noted above in HPI. All others negative. Denies fatigue, malaise, skin rash, joint pain, blurring vision, photophobia, neck pain, hemoptysis, chronic cough, nausea, vomiting, hematuria, burning micturition, BRBPR, chronic headaches. Current Outpatient Medications   Medication Sig    zinc sulfate (ZINCATE) 50 mg zinc (220 mg) capsule     selenium 200 mcg cap     OMEGA 3-DHA-EPA-FISH OIL PO 1 Capsule. MAGNESIUM OXIDE,ASPARTATE,CITR PO 30 mg.    OTHER,NON-FORMULARY, Quercetin 95% 500 mg tablet    vitamin e (E GEMS) 670 mg (1,000 unit) capsule Take 1,000 Units by mouth daily. atorvastatin (LIPITOR) 40 mg tablet TAKE 1 TABLET BY MOUTH DAILY    coenzyme q10 300 mg cap Take  by mouth.     fexofenadine (ALLEGRA) 180 mg tablet Take  by mouth.    multivitamin (ONE A DAY) tablet Take 1 Tab by mouth daily. fluticasone (FLONASE) 50 mcg/actuation nasal spray 2 Sprays by Both Nostrils route daily. cholecalciferol, VITAMIN D3, (VITAMIN D3) 5,000 unit tab tablet Take  by mouth daily. No current facility-administered medications for this visit. Past Surgical History:   Procedure Laterality Date    HX ORTHOPAEDIC  2013    back surgery       Allergies and Sensitivities:  Allergies   Allergen Reactions    Hayfebrol [Chlorpheniramine-Pseudoephed] Runny Nose     NOT ALLERGIC TO THIS PER PATIENT. Family History:  Family History   Problem Relation Age of Onset    Cancer Father         Liver    Heart Disease Father     Heart Attack Father     Hypertension Sister     Hypertension Brother     Arthritis-rheumatoid Mother        Social History:  Social History     Tobacco Use    Smoking status: Never    Smokeless tobacco: Never   Substance Use Topics    Alcohol use: Yes     Comment: 2 drinks per day    Drug use: No     He  reports that he has never smoked. He has never used smokeless tobacco.  He  reports current alcohol use. Physical Exam:  BP Readings from Last 3 Encounters:   08/19/22 132/82   07/01/21 136/87   06/22/20 137/81         Pulse Readings from Last 3 Encounters:   08/19/22 78   07/01/21 62   06/22/20 71          Wt Readings from Last 3 Encounters:   08/19/22 82.6 kg (182 lb)   07/01/21 82.6 kg (182 lb)   06/22/20 83.9 kg (185 lb)       Constitutional: Oriented to person, place, and time. HENT: Head: Normocephalic and atraumatic. Eyes: Conjunctivae and extraocular motions are normal.   Neck: No JVD present. Carotid bruit is not appreciated. Cardiovascular: Regular rhythm. No murmur, gallop or rubs appreciated  Lung: Breath sounds normal. No respiratory distress. No ronchi or rales appreciated  Abdominal: No tenderness. No rebound and no guarding. Musculoskeletal: There is no lower extremity edema. No cynosis  Lymphadenopathy:  No cervical or supraclavicular adenopathy appriciated. Neurological: No gross motor deficit noted. Skin: No visible skin rash noted. No Ear discharge noted  Psychiatric: Normal mood and affect. LABS:   @  Lab Results   Component Value Date/Time    WBC 9.1 2022 10:51 AM    HGB 16.2 (H) 2022 10:51 AM    HCT 48.3 (H) 2022 10:51 AM    PLATELET 033  10:51 AM    MCV 91.1 2022 10:51 AM     Lab Results   Component Value Date/Time    Sodium 138 2022 10:51 AM    Potassium 4.2 2022 10:51 AM    Chloride 106 2022 10:51 AM    CO2 29 2022 10:51 AM    Glucose 85 2022 10:51 AM    BUN 21 (H) 2022 10:51 AM    Creatinine 1.41 (H) 2022 10:51 AM     Lipids Latest Ref Rng & Units 2022   Chol, Total <200 MG/ 249(H) 206(H) 212(H) 263(H)   HDL 40 - 60 MG/DL 72(H) 80(H) 66(H) 76(H) 74(H)   LDL 0 - 100 MG/DL 93.8 152. 4(H) 127. 8(H) 124. 2(H) 171. 6(H)   Trig <150 MG/DL 81 83 61 59 87   Chol/HDL Ratio 0 - 5.0   2.5 3.1 3.1 2.8 3.6   Some recent data might be hidden     Lab Results   Component Value Date/Time    ALT (SGPT) 45 2022 10:51 AM     No results found for: HBA1C, SYH1ERRC, CBW6TBBB  Lab Results   Component Value Date/Time    TSH 4.33 (H) 2022 10:51 AM     EK2022: Atrial flutter with 4:1 block. 22: Sinus rhythm at 80 bpm.  PAC noted. STRESS TEST (EST, PHARM, NUC, ECHO etc)    CATHETERIZATION    IMPRESSION & PLAN:  Mr. Daylin Weston is a 57-year-old male    Atrial flutter:  Initial diagnosis by routine EKG in 2022. EKG today in the clinic showed sinus rhythm. Flutter is paroxysmal in nature and asymptomatic. Event monitor for 30 days to rule out frequency of episode  Echo to rule out abnormal cardiac structure and  Patient denies prior history of CHF, hypertension, diabetes or TIA or stroke or PE or aortic plaque or PAD  CHADS2 vascular score of 1 calculated with the patient. Discussed about a.fib and stroke prophylaxis.  ASA vs. Anticoagulation ( with coumadin / newer agent ). RIsk, benefit, side effects of medications and alternatives were discussed with patient regarding each medications. After discussion, patient preferred to do aspirin 81 mg daily. Hyperlipidemia Currently patient is on atorvastatin, Zetia. Last LDL 93. Continue same    The patient denies any symptoms to suggest angina or heart failure      Importance of diet and exercise was discussed with patient. This plan was discussed with patient who is in agreement. Thank you for allowing me to participate in patient care. Please feel free to call me if you have any question or concern. Cornell Perez MD  Please note: This document has been produced using voice recognition software. Unrecognized errors in transcription may be present.

## 2022-09-02 NOTE — LETTER
9/2/2022    Patient: Miriam Alcantara   YOB: 1955   Date of Visit: 9/2/2022     Berlin Fleming MD  [address] [id number]  [address] [facility] [address]    Dear Berlin Fleming MD,      Thank you for referring Mr. Miriam Alcantara to [address] [facility] for evaluation. My notes for this consultation are attached. If you have questions, please do not hesitate to call me. I look forward to following your patient along with you.       Sincerely,    Marvel Toro MD

## 2022-09-02 NOTE — PROGRESS NOTES
Identified pt with two pt identifiers(name and ). Reviewed record in preparation for visit and have obtained necessary documentation. Alexandre Bell presents today for   Chief Complaint   Patient presents with    New Patient       Pt denied DIZZINESS, SOB, CHEST PAIN/ PRESSURE, FATIGUE/WEAKNESS, HEADACHES, SWELLING. Alexandre Bell preferred language for health care discussion is english/other. Personal Protective Equipment:   Personal Protective Equipment was used including: mask-surgical and hands-gloves. Patient was placed on no precaution(s). Patient was masked. Precautions:   Patient currently on None  Patient currently roomed with door closed. Is someone accompanying this pt? no    Is the patient using any DME equipment during [address]? no    Depression Screening:  3 most recent PHQ Screens 2022   Little interest or pleasure in doing things Not at all   Feeling down, depressed, irritable, or hopeless Not at all   Total Score PHQ 2 0       Learning Assessment:  Learning Assessment 2019   PRIMARY LEARNER Patient   HIGHEST LEVEL OF EDUCATION - PRIMARY LEARNER  > 4 YEARS OF COLLEGE   BARRIERS PRIMARY LEARNER NONE   CO-LEARNER CAREGIVER No   PRIMARY LANGUAGE ENGLISH   LEARNER PREFERENCE PRIMARY READING   ANSWERED BY patient   RELATIONSHIP SELF       Abuse Screening:  Abuse Screening Questionnaire 2019   Do you ever feel afraid of your partner? N   Are you in a relationship with someone who physically or mentally threatens you? N   Is it safe for you to go home? Y       Fall Risk  Fall Risk Assessment, last 12 mths 2022   Able to walk? Yes   Fall in past 12 months? 0   Do you feel unsteady? 0   Are you worried about falling 0       Pt currently taking Anticoagulant therapy? no  Pt currently taking Antiplatelet therapy? no    Coordination of Care:  1. Have you been to the ER, urgent care clinic since your last visit? Hospitalized since your last visit? no    2.  Have you seen or consulted any other health care providers outside of the [facility] [employer] [place] [address] since your last visit? Include any pap smears or colon screening. no      Please see Red banners under Allergies and Med Rec to remove outside inquires. All correct information has been verified with patient and added to chart.      Medication's patient's would liked removed has been marked not taking to be removed per Verbal order and read back per Glynn Hightower MD

## 2022-09-02 NOTE — PATIENT INSTRUCTIONS
Testing   Echo  **call office 3-5 days after testing is completed for results**     New Medication/Medication Changes  Start Asprin 81 mg daily       **please allow 24-48 hrs for medication to be escribed to pharmacy** If you need any refills on medications please contact your pharmacy so that the request can be escribed to the provider for review. Other Testing  [employer]-will be calling you to confirm your address to send your 30 day Heart Monitor. Please allow 7-10 business days for monitor to arrive at your home.   Customer Service for [facility] [address]:  [phone]

## 2022-09-06 ENCOUNTER — APPOINTMENT (OUTPATIENT)
Dept: PHYSICAL THERAPY | Age: 67
End: 2022-09-06

## 2022-09-15 ENCOUNTER — TELEPHONE (OUTPATIENT)
Dept: CARDIOLOGY CLINIC | Age: 67
End: 2022-09-15

## 2022-09-15 NOTE — TELEPHONE ENCOUNTER
[facility] EKG    Auto triggered today showing atrial flutter with 3 sec pause.  Report is posted online

## 2022-09-16 NOTE — TELEPHONE ENCOUNTER
Attempted to contact pt at  number, no answer. Lvm for pt to return call to office at [phone]. Will continue to try to contact pt.

## 2022-11-07 DIAGNOSIS — E78.00 PURE HYPERCHOLESTEROLEMIA: ICD-10-CM

## 2022-11-07 DIAGNOSIS — R79.89 ABNORMAL TSH: Primary | ICD-10-CM

## 2022-11-07 RX ORDER — ATORVASTATIN CALCIUM 80 MG/1
80 TABLET, FILM COATED ORAL DAILY
Qty: 90 TABLET | Refills: 3 | Status: SHIPPED | OUTPATIENT
Start: 2022-11-07

## 2022-11-22 DIAGNOSIS — I48.91 ATRIAL FIBRILLATION, UNSPECIFIED TYPE (HCC): ICD-10-CM

## 2022-12-09 ENCOUNTER — OFFICE VISIT (OUTPATIENT)
Dept: CARDIOLOGY CLINIC | Age: 67
End: 2022-12-09
Payer: MEDICARE

## 2022-12-09 VITALS
BODY MASS INDEX: 25.23 KG/M2 | TEMPERATURE: 98.6 F | WEIGHT: 186 LBS | OXYGEN SATURATION: 97 % | DIASTOLIC BLOOD PRESSURE: 75 MMHG | HEART RATE: 97 BPM | SYSTOLIC BLOOD PRESSURE: 133 MMHG

## 2022-12-09 DIAGNOSIS — I48.91 ATRIAL FIBRILLATION, UNSPECIFIED TYPE (HCC): Primary | ICD-10-CM

## 2022-12-09 DIAGNOSIS — E78.00 PURE HYPERCHOLESTEROLEMIA: ICD-10-CM

## 2022-12-09 NOTE — PROGRESS NOTES
Identified pt with two pt identifiers(name and ). Reviewed record in preparation for visit and have obtained necessary documentation. Sonia Hagen presents today for   Chief Complaint   Patient presents with    Cardiac Testing       Pt denied DIZZINESS, SOB, CHEST PAIN/ PRESSURE, FATIGUE/WEAKNESS, HEADACHES, SWELLING. Sonia Hagen preferred language for health care discussion is english/other. Personal Protective Equipment:   Personal Protective Equipment was used including: mask-surgical and hands-gloves. Patient was placed on no precaution(s). Patient was masked. Precautions:   Patient currently on None  Patient currently roomed with door closed. Is someone accompanying this pt? no    Is the patient using any DME equipment during [address]? no    Depression Screening:  3 most recent PHQ Screens 2022   Little interest or pleasure in doing things Not at all   Feeling down, depressed, irritable, or hopeless Not at all   Total Score PHQ 2 0       Learning Assessment:  Learning Assessment 2019   PRIMARY LEARNER Patient   HIGHEST LEVEL OF EDUCATION - PRIMARY LEARNER  > 4 YEARS OF COLLEGE   BARRIERS PRIMARY LEARNER NONE   CO-LEARNER CAREGIVER No   PRIMARY LANGUAGE ENGLISH   LEARNER PREFERENCE PRIMARY READING   ANSWERED BY patient   RELATIONSHIP SELF       Abuse Screening:  Abuse Screening Questionnaire 2019   Do you ever feel afraid of your partner? N   Are you in a relationship with someone who physically or mentally threatens you? N   Is it safe for you to go home? Y       Fall Risk  Fall Risk Assessment, last 12 mths 2022   Able to walk? Yes   Fall in past 12 months? 0   Do you feel unsteady? 0   Are you worried about falling 0       Pt currently taking Anticoagulant therapy? no  Pt currently taking Antiplatelet therapy? no    Coordination of Care:  1. Have you been to the ER, urgent care clinic since your last visit? Hospitalized since your last visit?  no    2.  Have you seen or consulted any other health care providers outside of the [facility] [place] [address] since your last visit? Include any pap smears or colon screening. no      Please see Red banners under Allergies and Med Rec to remove outside inquires. All correct information has been verified with patient and added to chart.      Medication's patient's would liked removed has been marked not taking to be removed per Verbal order and read back per Debi Leger MD

## 2022-12-09 NOTE — PROGRESS NOTES
Cardiovascular Specialists    Mr. Caio Martinez is a 75 y.o. male with a history of paroxysmal atrial flutter, BPH, hyperlipidemia    Patient is here today for follow-up appointment for his atrial flutter  Denies any prior history of MI or CHF  Patient underwent echocardiogram and event monitor placement since last time  Patient has no cardiac symptoms to report at this time. He denies any chest pain or chest tightness. He denies any palpitation, presyncope or syncope. He continues to swim without any limitation  Denies any nausea, vomiting, abdominal pain, fever, chills, sputum production. No hematuria or other bleeding complaints    Past Medical History:   Diagnosis Date    Atrial flutter ([address]     BPH associated with nocturia     Elevated PSA     Hypercholesterolemia        Review of Systems:  Cardiac symptoms as noted above in HPI. All others negative. Current Outpatient Medications   Medication Sig    atorvastatin (LIPITOR) 80 mg tablet Take 1 Tablet by mouth daily. zinc sulfate (ZINCATE) 50 mg zinc (220 mg) capsule     selenium 200 mcg cap     OMEGA 3-DHA-EPA-FISH OIL PO 1 Capsule. MAGNESIUM OXIDE,ASPARTATE,CITR PO 30 mg.    OTHER,NON-FORMULARY, Quercetin 95% 500 mg tablet    vitamin e (E GEMS) 670 mg (1,000 unit) capsule Take 1,000 Units by mouth daily. aspirin delayed-release 81 mg tablet Take 1 Tablet by mouth daily. coenzyme q10 300 mg cap Take  by mouth. fexofenadine (ALLEGRA) 180 mg tablet Take  by mouth.    multivitamin (ONE A DAY) tablet Take 1 Tab by mouth daily. fluticasone (FLONASE) 50 mcg/actuation nasal spray 2 Sprays by Both Nostrils route daily. cholecalciferol, VITAMIN D3, (VITAMIN D3) 5,000 unit tab tablet Take  by mouth daily. No current facility-administered medications for this visit.        Past Surgical History:   Procedure Laterality Date    HX ORTHOPAEDIC  2013    back surgery       Allergies and Sensitivities:  Allergies   Allergen Reactions    Hay Fever And Allergy Relief Itching, Other (comments), Runny Nose and Sneezing    Hayfebrol [Chlorpheniramine-Pseudoephed] Runny Nose     NOT ALLERGIC TO THIS PER PATIENT. Family History:  Family History   Problem Relation Age of Onset    Cancer Father         Liver    Heart Disease Father     Heart Attack Father     Hypertension Sister     Hypertension Brother     Arthritis-rheumatoid Mother        Social History:  Social History     Tobacco Use    Smoking status: Never    Smokeless tobacco: Never   Substance Use Topics    Alcohol use: Yes     Comment: 2 drinks per day    Drug use: No     He  reports that he has never smoked. He has never used smokeless tobacco.  He  reports current alcohol use. Physical Exam:  BP Readings from Last 3 Encounters:   12/09/22 133/75   11/02/22 135/75   08/19/22 132/82         Pulse Readings from Last 3 Encounters:   12/09/22 97   08/19/22 78   07/01/21 62          Wt Readings from Last 3 Encounters:   12/09/22 84.4 kg (186 lb)   11/02/22 82.6 kg (182 lb)   08/19/22 82.6 kg (182 lb)       Constitutional: Oriented to person, place, and time. HENT: Head: Normocephalic and atraumatic. Neck: No JVD present. Cardiovascular: Regular rhythm. No murmur, gallop or rubs appreciated  Lung: Breath sounds normal. No respiratory distress. No ronchi or rales appreciated  Abdominal: No tenderness. No rebound and no guarding. Musculoskeletal: There is no lower extremity edema.  No cynosis    LABS:   @  Lab Results   Component Value Date/Time    WBC 9.1 08/19/2022 10:51 AM    HGB 16.2 (H) 08/19/2022 10:51 AM    HCT 48.3 (H) 08/19/2022 10:51 AM    PLATELET 019 83/92/9351 10:51 AM    MCV 91.1 08/19/2022 10:51 AM     Lab Results   Component Value Date/Time    Sodium 138 08/19/2022 10:51 AM    Potassium 4.2 08/19/2022 10:51 AM    Chloride 106 08/19/2022 10:51 AM    CO2 29 08/19/2022 10:51 AM    Glucose 85 08/19/2022 10:51 AM    BUN 21 (H) 2022 10:51 AM    Creatinine 1.41 (H) 2022 10:51 AM     Lipids Latest Ref Rng & Units 2022   Chol, Total <200 MG/ 249(H) 206(H) 212(H) 263(H)   HDL 40 - 60 MG/DL 72(H) 80(H) 66(H) 76(H) 74(H)   LDL 0 - 100 MG/DL 93.8 152. 4(H) 127. 8(H) 124. 2(H) 171. 6(H)   Trig <150 MG/DL 81 83 61 59 87   Chol/HDL Ratio 0 - 5.0   2.5 3.1 3.1 2.8 3.6   Some recent data might be hidden     Lab Results   Component Value Date/Time    ALT (SGPT) 45 2022 10:51 AM     No results found for: HBA1C, HGH9DOFH, WQV4ONGS, JNB1JQIX  Lab Results   Component Value Date/Time    TSH 4.33 (H) 2022 10:51 AM     EK2022: Atrial flutter with 4:1 block. 22: Sinus rhythm at 80 bpm.  PAC noted. 22    ECHO ADULT COMPLETE 2022  Interpretation Summary    Left Ventricle: Normal left ventricular systolic function with a visually estimated EF of 55 - 60%. Left ventricle size is normal. Normal wall thickness. Normal wall motion. Normal diastolic function. Right Ventricle: Right ventricle is mildly dilated. Normal systolic function. TAPSE is normal.    Aortic Valve: Trace regurgitation. No stenosis. Mitral Valve: Trace regurgitation. No stenosis noted. Tricuspid Valve: Trace regurgitation. No stenosis noted. Right Atrium: Right atrium is mildly dilated. Pulmonary Arteries: Pulmonary hypertension not present. The estimated PASP is 18 mmHg. Aorta: Normal sized annulus, aortic root and aortic arch. Mildly dilated ascending aorta. Ao Ascending diameter is 3.7 cm. IMPRESSION & PLAN:  Mr. Elham Davison is a 75 y.o.male    Atrial flutter:  Initial diagnosis by routine EKG in 2022. Event monitor 2022 showed paroxysmal a.flutter 2% burden, PVC less than 1%  Flutter is paroxysmal in nature and asymptomatic.   Patient denies prior history of CHF, hypertension, diabetes or TIA or stroke or PE or aortic plaque or PAD    Patient has no history of hypertension. He checks blood pressure at home and usually it is between 827-615 systolic. At times, infrequently he has noted his blood pressure in 130 range. Assuming if he has hypertension, CHADS2 vascular score of 2 discussed with the patient. Risk of stroke off anticoagulation versus risk of bleed on anticoagulation calculated and discussed with the patient in great detail    After lengthy discussion, patient would like to further think about anticoagulation but for now he would like to continue only with aspirin. Risk, benefit, alternatives discussed in great detail    Hyperlipidemia Currently patient is on atorvastatin, Zetia. Last LDL 93. Continue same    The patient denies any symptoms to suggest angina or heart failure      Importance of diet and exercise was discussed with patient. This plan was discussed with patient who is in agreement. Thank you for allowing me to participate in patient care. Please feel free to call me if you have any question or concern. Nandini Avila MD  Please note: This document has been produced using voice recognition software. Unrecognized errors in transcription may be present.

## 2023-01-23 RX ORDER — ATORVASTATIN CALCIUM 40 MG/1
40 TABLET, FILM COATED ORAL DAILY
Qty: 90 TABLET | Refills: 3 | Status: SHIPPED | OUTPATIENT
Start: 2023-01-23

## 2023-02-13 DIAGNOSIS — I48.3 TYPICAL ATRIAL FLUTTER (HCC): Primary | ICD-10-CM

## 2024-07-29 ENCOUNTER — OFFICE VISIT (OUTPATIENT)
Age: 69
End: 2024-07-29
Payer: MEDICARE

## 2024-07-29 VITALS — HEIGHT: 72 IN | BODY MASS INDEX: 23.86 KG/M2 | WEIGHT: 176.2 LBS

## 2024-07-29 DIAGNOSIS — M15.1 DEGENERATIVE ARTHRITIS OF DISTAL INTERPHALANGEAL JOINT OF MIDDLE FINGER OF RIGHT HAND: ICD-10-CM

## 2024-07-29 DIAGNOSIS — M67.441 DIGITAL MUCOUS CYST OF FINGER OF RIGHT HAND: Primary | ICD-10-CM

## 2024-07-29 PROCEDURE — G8427 DOCREV CUR MEDS BY ELIG CLIN: HCPCS | Performed by: ORTHOPAEDIC SURGERY

## 2024-07-29 PROCEDURE — 3017F COLORECTAL CA SCREEN DOC REV: CPT | Performed by: ORTHOPAEDIC SURGERY

## 2024-07-29 PROCEDURE — 1123F ACP DISCUSS/DSCN MKR DOCD: CPT | Performed by: ORTHOPAEDIC SURGERY

## 2024-07-29 PROCEDURE — 73140 X-RAY EXAM OF FINGER(S): CPT | Performed by: ORTHOPAEDIC SURGERY

## 2024-07-29 PROCEDURE — 99203 OFFICE O/P NEW LOW 30 MIN: CPT | Performed by: ORTHOPAEDIC SURGERY

## 2024-07-29 PROCEDURE — 1036F TOBACCO NON-USER: CPT | Performed by: ORTHOPAEDIC SURGERY

## 2024-07-29 PROCEDURE — G8420 CALC BMI NORM PARAMETERS: HCPCS | Performed by: ORTHOPAEDIC SURGERY

## 2024-07-29 NOTE — PROGRESS NOTES
Daniel Garvin is a 69 y.o. male right handed retiree.  Worker's Compensation and legal considerations: none    Chief Complaint   Patient presents with    Finger Pain     Right middle finger cyst      Pain Score:   1    Subjective:     Initial HPI: Patient presents today with a history of a bump on the back of his right middle finger.  He has treated this with Keflex twice and reports this to reduce swelling.    Date of onset: Early 2024  Injury: No  Prior Treatment:  Yes: Comment: keflex  Contributory history: none    ROS: Review of Systems - General ROS: negative except HPI    Past Medical History:   Diagnosis Date    Atrial flutter (HCC)     BPH associated with nocturia     Elevated PSA     Hypercholesterolemia        Past Surgical History:   Procedure Laterality Date    ORTHOPEDIC SURGERY  2013    back surgery        Current Outpatient Medications   Medication Sig Dispense Refill    aspirin 81 MG EC tablet Take 1 tablet by mouth daily      vitamin D3 (CHOLECALCIFEROL) 125 MCG (5000 UT) TABS tablet Take by mouth daily      Coenzyme Q10 300 MG CAPS Take by mouth      fexofenadine (ALLEGRA) 180 MG tablet Take by mouth      fluticasone (FLONASE) 50 MCG/ACT nasal spray 2 sprays by Nasal route daily      zinc sulfate (ZINCATE) 220 (50 Zn) MG capsule ceived the following from [facility]: Outside name: zinc sulfate (ZINCATE) 50 mg zinc (220 mg) capsule       No current facility-administered medications for this visit.       No Known Allergies      Ht 1.829 m (6')   Wt 79.9 kg (176 lb 3.2 oz)   BMI 23.90 kg/m²   Physical Exam  Constitutional:       General: He is not in acute distress.     Appearance: Normal appearance. He is not ill-appearing.   Cardiovascular:      Pulses: Normal pulses.   Pulmonary:      Effort: Pulmonary effort is normal. No respiratory distress.   Abdominal:      General: Abdomen is flat. There is no distension.   Musculoskeletal:         General: Swelling and tenderness present.